# Patient Record
Sex: MALE | Race: WHITE | NOT HISPANIC OR LATINO | Employment: OTHER | URBAN - METROPOLITAN AREA
[De-identification: names, ages, dates, MRNs, and addresses within clinical notes are randomized per-mention and may not be internally consistent; named-entity substitution may affect disease eponyms.]

---

## 2024-08-29 ENCOUNTER — HOSPITAL ENCOUNTER (INPATIENT)
Facility: HOSPITAL | Age: 79
LOS: 12 days | Discharge: NURSING, REHAB OR LONG-TERM CARE | DRG: 064 | End: 2024-09-11
Attending: EMERGENCY MEDICINE
Payer: COMMERCIAL

## 2024-08-29 ENCOUNTER — APPOINTMENT (EMERGENCY)
Dept: RADIOLOGY | Facility: HOSPITAL | Age: 79
DRG: 064 | End: 2024-08-29
Payer: COMMERCIAL

## 2024-08-29 DIAGNOSIS — I63.9 ACUTE STROKE DUE TO ISCHEMIA (HCC): ICD-10-CM

## 2024-08-29 DIAGNOSIS — R41.82 ALTERED MENTAL STATUS: Primary | ICD-10-CM

## 2024-08-29 DIAGNOSIS — E53.8 B12 DEFICIENCY: ICD-10-CM

## 2024-08-29 PROBLEM — I63.40 CEREBROVASCULAR ACCIDENT (CVA) DUE TO EMBOLISM OF CEREBRAL ARTERY (HCC): Status: ACTIVE | Noted: 2024-08-29

## 2024-08-29 PROBLEM — F03.90 DEMENTIA (HCC): Status: ACTIVE | Noted: 2024-08-29

## 2024-08-29 LAB
ALBUMIN SERPL BCG-MCNC: 3.9 G/DL (ref 3.5–5)
ALP SERPL-CCNC: 54 U/L (ref 34–104)
ALT SERPL W P-5'-P-CCNC: 10 U/L (ref 7–52)
AMPHETAMINES SERPL QL SCN: NEGATIVE
ANION GAP SERPL CALCULATED.3IONS-SCNC: 8 MMOL/L (ref 4–13)
AST SERPL W P-5'-P-CCNC: 16 U/L (ref 13–39)
ATRIAL RATE: 61 BPM
BARBITURATES UR QL: NEGATIVE
BASOPHILS # BLD AUTO: 0.08 THOUSANDS/ÂΜL (ref 0–0.1)
BASOPHILS NFR BLD AUTO: 1 % (ref 0–1)
BENZODIAZ UR QL: NEGATIVE
BILIRUB SERPL-MCNC: 1.17 MG/DL (ref 0.2–1)
BILIRUB UR QL STRIP: NEGATIVE
BUN SERPL-MCNC: 17 MG/DL (ref 5–25)
CALCIUM SERPL-MCNC: 8.7 MG/DL (ref 8.4–10.2)
CHLORIDE SERPL-SCNC: 101 MMOL/L (ref 96–108)
CLARITY UR: CLEAR
CO2 SERPL-SCNC: 27 MMOL/L (ref 21–32)
COCAINE UR QL: NEGATIVE
COLOR UR: NORMAL
CREAT SERPL-MCNC: 1.13 MG/DL (ref 0.6–1.3)
EOSINOPHIL # BLD AUTO: 0.53 THOUSAND/ÂΜL (ref 0–0.61)
EOSINOPHIL NFR BLD AUTO: 5 % (ref 0–6)
ERYTHROCYTE [DISTWIDTH] IN BLOOD BY AUTOMATED COUNT: 14.7 % (ref 11.6–15.1)
ETHANOL SERPL-MCNC: <10 MG/DL
FENTANYL UR QL SCN: NEGATIVE
GFR SERPL CREATININE-BSD FRML MDRD: 62 ML/MIN/1.73SQ M
GLUCOSE SERPL-MCNC: 92 MG/DL (ref 65–140)
GLUCOSE UR STRIP-MCNC: NEGATIVE MG/DL
HCT VFR BLD AUTO: 44.5 % (ref 36.5–49.3)
HGB BLD-MCNC: 14.7 G/DL (ref 12–17)
HGB UR QL STRIP.AUTO: NEGATIVE
HYDROCODONE UR QL SCN: NEGATIVE
IMM GRANULOCYTES # BLD AUTO: 0.04 THOUSAND/UL (ref 0–0.2)
IMM GRANULOCYTES NFR BLD AUTO: 0 % (ref 0–2)
KETONES UR STRIP-MCNC: NEGATIVE MG/DL
LEUKOCYTE ESTERASE UR QL STRIP: NEGATIVE
LYMPHOCYTES # BLD AUTO: 2.73 THOUSANDS/ÂΜL (ref 0.6–4.47)
LYMPHOCYTES NFR BLD AUTO: 28 % (ref 14–44)
MCH RBC QN AUTO: 31 PG (ref 26.8–34.3)
MCHC RBC AUTO-ENTMCNC: 33 G/DL (ref 31.4–37.4)
MCV RBC AUTO: 94 FL (ref 82–98)
METHADONE UR QL: NEGATIVE
MONOCYTES # BLD AUTO: 1.09 THOUSAND/ÂΜL (ref 0.17–1.22)
MONOCYTES NFR BLD AUTO: 11 % (ref 4–12)
NEUTROPHILS # BLD AUTO: 5.26 THOUSANDS/ÂΜL (ref 1.85–7.62)
NEUTS SEG NFR BLD AUTO: 55 % (ref 43–75)
NITRITE UR QL STRIP: NEGATIVE
NRBC BLD AUTO-RTO: 0 /100 WBCS
OPIATES UR QL SCN: NEGATIVE
OXYCODONE+OXYMORPHONE UR QL SCN: NEGATIVE
P AXIS: 72 DEGREES
PCP UR QL: NEGATIVE
PH UR STRIP.AUTO: 5 [PH]
PLATELET # BLD AUTO: 353 THOUSANDS/UL (ref 149–390)
PMV BLD AUTO: 9.4 FL (ref 8.9–12.7)
POTASSIUM SERPL-SCNC: 4.1 MMOL/L (ref 3.5–5.3)
PR INTERVAL: 156 MS
PROT SERPL-MCNC: 6.9 G/DL (ref 6.4–8.4)
PROT UR STRIP-MCNC: NEGATIVE MG/DL
QRS AXIS: 74 DEGREES
QRSD INTERVAL: 74 MS
QT INTERVAL: 416 MS
QTC INTERVAL: 418 MS
RBC # BLD AUTO: 4.74 MILLION/UL (ref 3.88–5.62)
SODIUM SERPL-SCNC: 136 MMOL/L (ref 135–147)
SP GR UR STRIP.AUTO: 1.02 (ref 1–1.03)
T WAVE AXIS: 65 DEGREES
THC UR QL: NEGATIVE
TSH SERPL DL<=0.05 MIU/L-ACNC: 1.08 UIU/ML (ref 0.45–4.5)
UROBILINOGEN UR STRIP-ACNC: <2 MG/DL
VENTRICULAR RATE: 61 BPM
WBC # BLD AUTO: 9.73 THOUSAND/UL (ref 4.31–10.16)

## 2024-08-29 PROCEDURE — 36415 COLL VENOUS BLD VENIPUNCTURE: CPT

## 2024-08-29 PROCEDURE — 70496 CT ANGIOGRAPHY HEAD: CPT

## 2024-08-29 PROCEDURE — 84443 ASSAY THYROID STIM HORMONE: CPT

## 2024-08-29 PROCEDURE — 99223 1ST HOSP IP/OBS HIGH 75: CPT | Performed by: INTERNAL MEDICINE

## 2024-08-29 PROCEDURE — 81003 URINALYSIS AUTO W/O SCOPE: CPT

## 2024-08-29 PROCEDURE — 85025 COMPLETE CBC W/AUTO DIFF WBC: CPT

## 2024-08-29 PROCEDURE — 93005 ELECTROCARDIOGRAM TRACING: CPT

## 2024-08-29 PROCEDURE — 93010 ELECTROCARDIOGRAM REPORT: CPT | Performed by: INTERNAL MEDICINE

## 2024-08-29 PROCEDURE — 80307 DRUG TEST PRSMV CHEM ANLYZR: CPT | Performed by: EMERGENCY MEDICINE

## 2024-08-29 PROCEDURE — 99285 EMERGENCY DEPT VISIT HI MDM: CPT | Performed by: EMERGENCY MEDICINE

## 2024-08-29 PROCEDURE — 99284 EMERGENCY DEPT VISIT MOD MDM: CPT

## 2024-08-29 PROCEDURE — 71046 X-RAY EXAM CHEST 2 VIEWS: CPT

## 2024-08-29 PROCEDURE — 80053 COMPREHEN METABOLIC PANEL: CPT

## 2024-08-29 PROCEDURE — 82077 ASSAY SPEC XCP UR&BREATH IA: CPT | Performed by: EMERGENCY MEDICINE

## 2024-08-29 PROCEDURE — 70498 CT ANGIOGRAPHY NECK: CPT

## 2024-08-29 RX ORDER — OLANZAPINE 10 MG/2ML
2.5 INJECTION, POWDER, FOR SOLUTION INTRAMUSCULAR ONCE
Status: COMPLETED | OUTPATIENT
Start: 2024-08-29 | End: 2024-08-29

## 2024-08-29 RX ORDER — WATER 10 ML/10ML
INJECTION INTRAMUSCULAR; INTRAVENOUS; SUBCUTANEOUS
Status: COMPLETED
Start: 2024-08-29 | End: 2024-08-29

## 2024-08-29 RX ORDER — ASPIRIN 81 MG/1
81 TABLET, CHEWABLE ORAL DAILY
Status: DISCONTINUED | OUTPATIENT
Start: 2024-08-30 | End: 2024-09-11 | Stop reason: HOSPADM

## 2024-08-29 RX ORDER — ATORVASTATIN CALCIUM 40 MG/1
40 TABLET, FILM COATED ORAL EVERY EVENING
Status: DISCONTINUED | OUTPATIENT
Start: 2024-08-29 | End: 2024-09-11 | Stop reason: HOSPADM

## 2024-08-29 RX ADMIN — IOHEXOL 85 ML: 350 INJECTION, SOLUTION INTRAVENOUS at 20:58

## 2024-08-29 RX ADMIN — OLANZAPINE 2.5 MG: 10 INJECTION, POWDER, LYOPHILIZED, FOR SOLUTION INTRAMUSCULAR at 23:52

## 2024-08-29 RX ADMIN — WATER 10 ML: 1 INJECTION INTRAMUSCULAR; INTRAVENOUS; SUBCUTANEOUS at 23:52

## 2024-08-29 NOTE — ED PROVIDER NOTES
History  Chief Complaint   Patient presents with    Medical Problem     Pt arrived via EMS. 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 Pt Aox4 and has no complaints on arrival.     79-year-old male presents ED via EMS for evaluation of altered mental status beginning this morning.  Per family, patient has been more confused today.  He normally is alert and oriented x 4.  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  He lives at home with his wife.  Denies sick contacts.  Patient denies headache, dizziness, lightheadedness, trouble walking, focal weakness or numbness, chest pain, shortness of breath, abdominal pain, nausea, vomiting, urinary complaints.        None       History reviewed. No pertinent past medical history.    History reviewed. No pertinent surgical history.    History reviewed. No pertinent family history.  I have reviewed and agree with the history as documented.    E-Cigarette/Vaping     E-Cigarette/Vaping Substances           Review of Systems   Constitutional:  Negative for chills and fever.   HENT:  Negative for congestion.    Respiratory:  Negative for cough and shortness of breath.    Cardiovascular:  Negative for chest pain.   Gastrointestinal:  Negative for abdominal pain, nausea and vomiting.   Genitourinary:  Negative for difficulty urinating.   Musculoskeletal:  Negative for back pain and neck pain.   Skin:  Negative for color change.   Neurological:  Negative for dizziness, syncope, weakness, light-headedness, numbness and headaches.   Psychiatric/Behavioral:  Positive for confusion.        Physical Exam  ED Triage Vitals [08/29/24 1802]   Temperature Pulse Respirations Blood Pressure SpO2   97.6 °F (36.4 °C) 57 18 161/72 97 %      Temp Source Heart Rate Source Patient Position - Orthostatic VS BP Location FiO2 (%)   Oral Monitor Sitting Right arm --      Pain Score       --              Orthostatic Vital Signs  Vitals:    08/29/24 1802 08/29/24 1900   BP: 161/72 125/64   Pulse: 57 56   Patient Position - Orthostatic VS: Sitting Sitting       Physical Exam  Vitals and nursing note reviewed.   Constitutional:       General: He is not in acute distress.     Appearance: Normal appearance. He is normal weight. He is not ill-appearing, toxic-appearing or diaphoretic.   HENT:      Head: Normocephalic and atraumatic.      Nose: No rhinorrhea.      Mouth/Throat:      Mouth: Mucous membranes are moist.   Eyes:      Conjunctiva/sclera: Conjunctivae normal.   Cardiovascular:      Rate and Rhythm: Normal rate and regular rhythm.      Pulses: Normal pulses.      Heart sounds: Normal heart sounds.   Pulmonary:      Effort: Pulmonary effort is normal.      Breath sounds: Normal breath sounds.   Abdominal:      General: Abdomen is flat.      Palpations: Abdomen is soft.      Tenderness: There is no abdominal tenderness.   Musculoskeletal:         General: No tenderness.      Cervical back: Neck supple.      Right lower leg: No edema.      Left lower leg: No edema.   Skin:     General: Skin is warm and dry.      Capillary Refill: Capillary refill takes less than 2 seconds.   Neurological:      General: No focal deficit present.      Mental Status: He is alert.      Cranial Nerves: No cranial nerve deficit.      Sensory: No sensory deficit.      Motor: No weakness.      Coordination: Coordination normal.      Comments: Patient oriented to person and place, but not month, year, or situation   Psychiatric:         Mood and Affect: Mood normal.         Behavior: Behavior normal.         ED Medications  Medications   iohexol (OMNIPAQUE) 350 MG/ML injection (MULTI-DOSE) 85 mL (85 mL Intravenous Given 8/29/24 2058)       Diagnostic Studies  Results Reviewed       Procedure Component Value Units Date/Time    TSH [[id number]]  (Normal) Collected: 08/29/24 1944    Lab Status: Final result Specimen: Blood from Arm,  Right Updated: 08/29/24 2105     TSH 3RD GENERATON 1.076 uIU/mL     Comprehensive metabolic panel [[id number]]  (Abnormal) Collected: 08/29/24 1944    Lab Status: Final result Specimen: Blood from Arm, Right Updated: 08/29/24 2030     Sodium 136 mmol/L      Potassium 4.1 mmol/L      Chloride 101 mmol/L      CO2 27 mmol/L      ANION GAP 8 mmol/L      BUN 17 mg/dL      Creatinine 1.13 mg/dL      Glucose 92 mg/dL      Calcium 8.7 mg/dL      AST 16 U/L      ALT 10 U/L      Alkaline Phosphatase 54 U/L      Total Protein 6.9 g/dL      Albumin 3.9 g/dL      Total Bilirubin 1.17 mg/dL      eGFR 62 ml/min/1.73sq m     Narrative:      National Kidney Disease Foundation guidelines for Chronic Kidney Disease (CKD):     Stage 1 with normal or high GFR (GFR > 90 mL/min/1.73 square meters)    Stage 2 Mild CKD (GFR = 60-89 mL/min/1.73 square meters)    Stage 3A Moderate CKD (GFR = 45-59 mL/min/1.73 square meters)    Stage 3B Moderate CKD (GFR = 30-44 mL/min/1.73 square meters)    Stage 4 Severe CKD (GFR = 15-29 mL/min/1.73 square meters)    Stage 5 End Stage CKD (GFR <15 mL/min/1.73 square meters)  Note: GFR calculation is accurate only with a steady state creatinine    Rapid drug screen, urine [[id number]]  (Normal) Collected: 08/29/24 1843    Lab Status: Final result Specimen: Urine, Other Updated: 08/29/24 1916     Amph/Meth UR Negative     Barbiturate Ur Negative     Benzodiazepine Urine Negative     Cocaine Urine Negative     Methadone Urine Negative     Opiate Urine Negative     PCP Ur Negative     THC Urine Negative     Oxycodone Urine Negative     Fentanyl Urine Negative     HYDROCODONE URINE Negative    Narrative:      FOR MEDICAL PURPOSES ONLY.   IF CONFIRMATION NEEDED PLEASE CONTACT THE LAB WITHIN 5 DAYS.    Drug Screen Cutoff Levels:  AMPHETAMINE/METHAMPHETAMINES  1000 ng/mL  BARBITURATES     200 ng/mL  BENZODIAZEPINES     200 ng/mL  COCAINE      300 ng/mL  METHADONE      300 ng/mL  OPIATES      300  ng/mL  PHENCYCLIDINE     25 ng/mL  THC       50 ng/mL  OXYCODONE      100 ng/mL  FENTANYL      5 ng/mL  HYDROCODONE     300 ng/mL    Ethanol [[id number]]  (Normal) Collected: 08/29/24 1830    Lab Status: Final result Specimen: Blood from Arm, Left Updated: 08/29/24 1858     Ethanol Lvl <10 mg/dL     UA w Reflex to Microscopic w Reflex to Culture [[id number]] Collected: 08/29/24 1843    Lab Status: Final result Specimen: Urine, Other Updated: 08/29/24 1857     Color, UA Light Yellow     Clarity, UA Clear     Specific Gravity, UA 1.021     pH, UA 5.0     Leukocytes, UA Negative     Nitrite, UA Negative     Protein, UA Negative mg/dl      Glucose, UA Negative mg/dl      Ketones, UA Negative mg/dl      Urobilinogen, UA <2.0 mg/dl      Bilirubin, UA Negative     Occult Blood, UA Negative    CBC and differential [[id number]] Collected: 08/29/24 1830    Lab Status: Final result Specimen: Blood from Arm, Left Updated: 08/29/24 1844     WBC 9.73 Thousand/uL      RBC 4.74 Million/uL      Hemoglobin 14.7 g/dL      Hematocrit 44.5 %      MCV 94 fL      MCH 31.0 pg      MCHC 33.0 g/dL      RDW 14.7 %      MPV 9.4 fL      Platelets 353 Thousands/uL      nRBC 0 /100 WBCs      Segmented % 55 %      Immature Grans % 0 %      Lymphocytes % 28 %      Monocytes % 11 %      Eosinophils Relative 5 %      Basophils Relative 1 %      Absolute Neutrophils 5.26 Thousands/µL      Absolute Immature Grans 0.04 Thousand/uL      Absolute Lymphocytes 2.73 Thousands/µL      Absolute Monocytes 1.09 Thousand/µL      Eosinophils Absolute 0.53 Thousand/µL      Basophils Absolute 0.08 Thousands/µL                    CTA head and neck with and without contrast   Final Result by Royal Gardner DO (08/29 2123)      CT Brain:   No acute intracranial no hemorrhage or mass effect. Severe chronic microvascular ischemic change and superimposed chronic infarcts as detailed above. Age-indeterminate right temporal infarct, likely chronic. MRI brain should be  "considered for further    evaluation of this area and other potential small areas of infarction unseen on CT.      CT Angiography:   No evidence of large aneurysm, high-grade stenosis, or large vessel occlusion.      This study was marked for \"Immediate\" notification in EPIC.               Workstation performed: [id number]         XR chest 2 views    (Results Pending)         Procedures  Procedures      ED Course  ED Course as of 08/29/24 2142   Thu Aug 29, 2024   1918 Rapid drug screen, urine  negative   1918 WBC: 9.73   1918 UA w Reflex to Microscopic w Reflex to Culture  negative                                       Medical Decision Making  79-year-old male with past medical history watershed infarct presents ED for evaluation of altered mental status beginning this morning before 11 AM.  Patient himself has no complaints.  On exam, vital signs unremarkable.  Afebrile.  Patient is in no acute distress.  He is oriented to person and place, but  not month, year, situation.  Patient has 5 out of 5 strength in all extremities, normal coordination.  No focal neurologic deficit.  Differential diagnosis:  I doubt infectious etiology as patient has normal vital signs and is not ill-appearing. Rule out stroke.  Plan: CBC, CMP, TSH, UA.  Will order CTA head and neck.  Plan: Will likely admit this patient for inpatient MRI.  At the time of signout, CTA head and neck read pending.    Amount and/or Complexity of Data Reviewed  Labs: ordered. Decision-making details documented in ED Course.  Radiology: ordered.    Risk  Prescription drug management.          Disposition  Final diagnoses:   Altered mental status     Time reflects when diagnosis was documented in both MDM as applicable and the Disposition within this note       Time User Action Codes Description Comment    8/29/2024  8:52 PM Angel Ruano Add [R41.82] Altered mental status           ED Disposition       None          Follow-up Information    None   "       Patient's Medications    No medications on file     No discharge procedures on file.    PDMP Review       None             ED Provider  Attending physically available and evaluated Edy Putnam. I managed the patient along with the ED Attending.    Electronically Signed by           Angel Ruano DO  08/29/24 6823

## 2024-08-29 NOTE — ED ATTENDING ATTESTATION
I saw and evaluated the patient. I have discussed the patient with the resident physician and agree with the resident's findings, assessment and plan as documented in the resident physician's note, unless otherwise documented below. All available laboratory and imaging studies were reviewed by myself.  I was present for key portions of any procedure(s) performed by the resident and I was immediately available to provide assistance.     I agree with the current assessment done in the Emergency Department. I have conducted an independent evaluation of this patient    Final Diagnosis:  1. Altered mental status             Chief Complaint   Patient presents with    Medical Problem     Pt arrived via EMS. 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 Pt Aox4 and has no complaints on arrival.     This is a 79 y.o. male with a history of CVA presenting by EMS for evaluation for increased confusion. Patient appears confused and is a poor historian. He has no complaints at this time. Per family members, patient was noted to be altered starting at 1100 today. Says this is how he presented when he had watershed stroke many years ago. He has not had any other focal neuro deficits. No speech change, weakness, sensory change, vision change, abnormal coordination, abnormal gait. Denies fever, chills, cough, chest pain, SOB, n/v/d, abdominal pain, headache, any other complaints. Family says he does not use drugs/ETOH, no risk of carbon monoxide exposure.       PMH:   has no past medical history on file.    PSH:   has no past surgical history on file.    Social:  Social History     Substance and Sexual Activity   Alcohol Use None     Social History     Tobacco Use   Smoking Status Not on file   Smokeless Tobacco Not on file     Social History     Substance and Sexual Activity   Drug Use Not on file     PE:  Vitals:    09/04/24 0225 09/04/24 0705 09/04/24 1544  09/05/24 0715   BP: 100/54 123/72 120/75 123/73   Pulse: 65 66 61 71   Resp:   16 19   Temp:  98.2 °F (36.8 °C) 97.8 °F (36.6 °C) 98.5 °F (36.9 °C)   TempSrc:       SpO2: 96% 95% 96% 93%   Weight:       Height:               Physical exam:  GENERAL APPEARANCE: Appears comfortable, no acute distress, calm and cooperative   NEURO: Oriented to self, place, but not year or month, no focal deficits, cranial nerves grossly intact, clear fluent speech, no facial asymmetry, 5/5 strength in all four extremities. No pronator drift. Normal finger nose finger. Visual fields intact Normal gait.    HEENT: Normocephalic, atraumatic, moist mucous membranes   Neck: Supple, full ROM  CV: RRR, no murmurs, rubs, or gallops  LUNGS: CTAB, no wheezing, rales, or rhonchi  GI: Abdomen soft, non-tender, no rebound or guarding   MSK: Extremities non-tender, no pitting edema  SKIN: Warm and dry      Assessment and plan: This is a 79 y.o. male with a history of CVA presenting by EMS for evaluation for increased confusion. Within ddx consider metabolic abnormality, thyroid disease, drug/ETOH, medication effect, CVA, infection such as UTI. Labs and imaging to evaluate. Patient currently resting comfortably with no complaints.     At time of sign out, pending CT. Plan to admit if not returned to baseline. Oncoming physician aware of patient's status and agrees to follow up and reassess. Patient remains stable at this time.       Code Status: Level 3 - DNAR and DNI  Advance Directive and Living Will:      Power of :    POLST:      Medications   atorvastatin (LIPITOR) tablet 40 mg (40 mg Oral Given 9/4/24 1826)   aspirin chewable tablet 81 mg (81 mg Oral Given 9/5/24 0908)   OLANZapine (ZyPREXA) IM injection 5 mg (5 mg Intramuscular Given 8/30/24 2311)   cyanocobalamin injection 1,000 mcg (1,000 mcg Intramuscular Given 8/31/24 1158)   cyanocobalamin (VITAMIN B-12) tablet 1,000 mcg (1,000 mcg Oral Given 9/5/24 0908)   enoxaparin (LOVENOX)  "subcutaneous injection 40 mg (40 mg Subcutaneous Given 9/5/24 0908)   senna-docusate sodium (SENOKOT S) 8.6-50 mg per tablet 1 tablet (1 tablet Oral Not Given 9/5/24 0906)   iohexol (OMNIPAQUE) 350 MG/ML injection (MULTI-DOSE) 85 mL (85 mL Intravenous Given 8/29/24 2058)   OLANZapine (ZyPREXA) IM injection 2.5 mg (2.5 mg Intramuscular Given 8/29/24 2352)   sterile water injection **ADS Override Pull** (10 mL  Given 8/29/24 2352)   LORazepam (ATIVAN) injection 0.5 mg (0.5 mg Intravenous Given 8/30/24 1542)   sterile water injection **ADS Override Pull** (10 mL  Given 8/30/24 2311)     MRI brain wo contrast   Final Result      -Acute infarct involving the right temporal lobe with petechial hemorrhage.   -Small area of acute infarct within the left parietal periventricular white matter.   -Remote supra and infratentorial infarcts and chronic microangiopathic changes as above.         The study was marked in EPIC for immediate notification.      Workstation performed: [id number]         CTA head and neck with and without contrast   Final Result      CT Brain:   No acute intracranial no hemorrhage or mass effect. Severe chronic microvascular ischemic change and superimposed chronic infarcts as detailed above. Age-indeterminate right temporal infarct, likely chronic. MRI brain should be considered for further    evaluation of this area and other potential small areas of infarction unseen on CT.      CT Angiography:   No evidence of large aneurysm, high-grade stenosis, or large vessel occlusion.      This study was marked for \"Immediate\" notification in EPIC.               Workstation performed: [id number]         XR chest 2 views   Final Result      No acute cardiopulmonary disease.            Workstation performed: [id number]           Orders Placed This Encounter   Procedures    XR chest 2 views    CTA head and neck with and without contrast    MRI brain wo contrast    UA w Reflex to Microscopic w Reflex to Culture    CBC " and differential    Comprehensive metabolic panel    TSH    Ethanol    Rapid drug screen, urine    Lipid Panel with Direct LDL reflex    Hemoglobin A1c w/EAG Estimation    Comprehensive metabolic panel    Magnesium    CBC and differential    Protime-INR    APTT    Vitamin B12    Basic metabolic panel    CBC    Basic metabolic panel    CBC    Diet Cardiovascular; Sodium 2 GM    Nursing communication Continue IV as ordered.    Notify admitting physician    Notify admitting physician on arrival    Vital Signs q 1h x 4 hours    Vital Signs q 2 h x 8 hours    Vital Signs q 4h x 72 hours    Vital Signs q 8h if stable    Notify physician    Up and OOB as tolerated    I/O    Neuro checks    Provide Stroke Education to Patients    Nursing dysphagia screen prior to starting diet    Baseline NIH stroke scale on Admission    Reassess NIH stroke scale every 24 hours for 2 days    NIH stroke scale at Discharge    Insert peripheral IV    Maintain IV access    Apply SCD or Foot pumps    Nursing Communication Notify Provider/AP of positive CAM    Nursing dysphagia screen    Urinary retention protocol    Nursing Communication Monitor patient for constipation and notify provider if bowel regimen not ordered.    Nursing Communication Daytime Wakefulness Hygiene From 7:00 am until 7:00 pm, potential interventions could include the following, if applicable based on clinical condition: - Ambient light should reflect daytime (lights on, window shades and door...    Nursing Communication Nighttime Sleep Hygiene From 7:00 pm until 7:00 am, potential interventions could include the following, if applicable based on clinical condition: - Ambient light in room to reflect nighttime (lights off, window shades close...    Up and OOB as tolerated    Level 3 - DNAR (Do Not Attempt Resuscitation) and DNI (Do Not Intubate)    Inpatient consult to Neurology    Inpatient Consult to Nutrition Services    Inpatient consult to Case Management    CRISTINA suresh  and treat    PT eval and treat    SPEECH Language eval and treatment    ECG 12 lead    Echo complete w/ contrast if indicated    Place in Observation    INPATIENT ADMISSION    Fall precautions     Labs Reviewed   COMPREHENSIVE METABOLIC PANEL - Abnormal       Result Value Ref Range Status    Sodium 136  135 - 147 mmol/L Final    Potassium 4.1  3.5 - 5.3 mmol/L Final    Chloride 101  96 - 108 mmol/L Final    CO2 27  21 - 32 mmol/L Final    ANION GAP 8  4 - 13 mmol/L Final    BUN 17  5 - 25 mg/dL Final    Creatinine 1.13  0.60 - 1.30 mg/dL Final    Comment: Standardized to IDMS reference method    Glucose 92  65 - 140 mg/dL Final    Comment: If the patient is fasting, the ADA then defines impaired fasting glucose as > 100 mg/dL and diabetes as > or equal to 123 mg/dL.    Calcium 8.7  8.4 - 10.2 mg/dL Final    AST 16  13 - 39 U/L Final    ALT 10  7 - 52 U/L Final    Comment: Specimen collection should occur prior to Sulfasalazine administration due to the potential for falsely depressed results.     Alkaline Phosphatase 54  34 - 104 U/L Final    Total Protein 6.9  6.4 - 8.4 g/dL Final    Albumin 3.9  3.5 - 5.0 g/dL Final    Total Bilirubin 1.17 (*) 0.20 - 1.00 mg/dL Final    Comment: Use of this assay is not recommended for patients undergoing treatment with eltrombopag due to the potential for falsely elevated results.  N-acetyl-p-benzoquinone imine (metabolite of Acetaminophen) will generate erroneously low results in samples for patients that have taken an overdose of Acetaminophen.    eGFR 62  ml/min/1.73sq m Final    Narrative:     National Kidney Disease Foundation guidelines for Chronic Kidney Disease (CKD):     Stage 1 with normal or high GFR (GFR > 90 mL/min/1.73 square meters)    Stage 2 Mild CKD (GFR = 60-89 mL/min/1.73 square meters)    Stage 3A Moderate CKD (GFR = 45-59 mL/min/1.73 square meters)    Stage 3B Moderate CKD (GFR = 30-44 mL/min/1.73 square meters)    Stage 4 Severe CKD (GFR = 15-29 mL/min/1.73  square meters)    Stage 5 End Stage CKD (GFR <15 mL/min/1.73 square meters)  Note: GFR calculation is accurate only with a steady state creatinine   TSH, 3RD GENERATION - Normal    TSH 3RD GENERATON 1.076  0.450 - 4.500 uIU/mL Final    Comment: Adult TSH (3rd generation) reference range follows the recommended guidelines of the American Thyroid Association, January, 2020.   MEDICAL ALCOHOL - Normal    Ethanol Lvl <10  <10 mg/dL Final   RAPID DRUG SCREEN, URINE - Normal    Amph/Meth UR Negative  Negative Final    Barbiturate Ur Negative  Negative Final    Benzodiazepine Urine Negative  Negative Final    Cocaine Urine Negative  Negative Final    Methadone Urine Negative  Negative Final    Opiate Urine Negative  Negative Final    PCP Ur Negative  Negative Final    THC Urine Negative  Negative Final    Oxycodone Urine Negative  Negative Final    Fentanyl Urine Negative  Negative Final    HYDROCODONE URINE Negative  Negative Final    Narrative:     FOR MEDICAL PURPOSES ONLY.   IF CONFIRMATION NEEDED PLEASE CONTACT THE LAB WITHIN 5 DAYS.    Drug Screen Cutoff Levels:  AMPHETAMINE/METHAMPHETAMINES  1000 ng/mL  BARBITURATES     200 ng/mL  BENZODIAZEPINES     200 ng/mL  COCAINE      300 ng/mL  METHADONE      300 ng/mL  OPIATES      300 ng/mL  PHENCYCLIDINE     25 ng/mL  THC       50 ng/mL  OXYCODONE      100 ng/mL  FENTANYL      5 ng/mL  HYDROCODONE     300 ng/mL   UA W REFLEX TO MICROSCOPIC WITH REFLEX TO CULTURE    Color, UA Light Yellow   Final    Clarity, UA Clear   Final    Specific Gravity, UA 1.021  1.003 - 1.030 Final    pH, UA 5.0  4.5, 5.0, 5.5, 6.0, 6.5, 7.0, 7.5, 8.0 Final    Leukocytes, UA Negative  Negative Final    Nitrite, UA Negative  Negative Final    Protein, UA Negative  Negative mg/dl Final    Glucose, UA Negative  Negative mg/dl Final    Ketones, UA Negative  Negative mg/dl Final    Urobilinogen, UA <2.0  <2.0 mg/dl mg/dl Final    Bilirubin, UA Negative  Negative Final    Occult Blood, UA Negative   "Negative Final   CBC AND DIFFERENTIAL    WBC 9.73  4.31 - 10.16 Thousand/uL Final    RBC 4.74  3.88 - 5.62 Million/uL Final    Hemoglobin 14.7  12.0 - 17.0 g/dL Final    Hematocrit 44.5  36.5 - 49.3 % Final    MCV 94  82 - 98 fL Final    MCH 31.0  26.8 - 34.3 pg Final    MCHC 33.0  31.4 - 37.4 g/dL Final    RDW 14.7  11.6 - 15.1 % Final    MPV 9.4  8.9 - 12.7 fL Final    Platelets 353  149 - 390 Thousands/uL Final    nRBC 0  /100 WBCs Final    Segmented % 55  43 - 75 % Final    Immature Grans % 0  0 - 2 % Final    Lymphocytes % 28  14 - 44 % Final    Monocytes % 11  4 - 12 % Final    Eosinophils Relative 5  0 - 6 % Final    Basophils Relative 1  0 - 1 % Final    Absolute Neutrophils 5.26  1.85 - 7.62 Thousands/µL Final    Absolute Immature Grans 0.04  0.00 - 0.20 Thousand/uL Final    Absolute Lymphocytes 2.73  0.60 - 4.47 Thousands/µL Final    Absolute Monocytes 1.09  0.17 - 1.22 Thousand/µL Final    Eosinophils Absolute 0.53  0.00 - 0.61 Thousand/µL Final    Basophils Absolute 0.08  0.00 - 0.10 Thousands/µL Final         Time reflects when diagnosis was documented in both MDM as applicable and the Disposition within this note       Time User Action Codes Description Comment    8/29/2024  8:52 PM Angel Ruano Add [R41.82] Altered mental status           ED Disposition       ED Disposition   Admit    Condition   Stable    Date/Time   Thu Aug 29, 2024 11:09 PM    Comment   Case was discussed with BRIGIDO and the patient's admission status was agreed to be Admission Status: observation status to the service of Dr. Canseco.               Follow-up Information    None       There are no discharge medications for this patient.    No discharge procedures on file.  None         Portions of the record may have been created with voice recognition software. Occasional wrong word or \"sound a like\" substitutions may have occurred due to the inherent limitations of voice recognition software. Read the chart carefully and recognize, " using context, where substitutions have occurred.    Electronically signed by:  Brit Townsend

## 2024-08-30 ENCOUNTER — APPOINTMENT (OUTPATIENT)
Dept: RADIOLOGY | Facility: HOSPITAL | Age: 79
DRG: 064 | End: 2024-08-30
Payer: COMMERCIAL

## 2024-08-30 ENCOUNTER — APPOINTMENT (OUTPATIENT)
Dept: NON INVASIVE DIAGNOSTICS | Facility: HOSPITAL | Age: 79
DRG: 064 | End: 2024-08-30
Payer: COMMERCIAL

## 2024-08-30 PROBLEM — J13 PNEUMOCOCCAL PNEUMONIA (HCC): Status: ACTIVE | Noted: 2024-08-30

## 2024-08-30 PROBLEM — Z90.81 S/P SPLENECTOMY: Status: ACTIVE | Noted: 2024-08-30

## 2024-08-30 LAB
ALBUMIN SERPL BCG-MCNC: 3.8 G/DL (ref 3.5–5)
ALP SERPL-CCNC: 52 U/L (ref 34–104)
ALT SERPL W P-5'-P-CCNC: 9 U/L (ref 7–52)
ANION GAP SERPL CALCULATED.3IONS-SCNC: 5 MMOL/L (ref 4–13)
AORTIC ROOT: 2.6 CM
APICAL FOUR CHAMBER EJECTION FRACTION: 55 %
APTT PPP: 26 SECONDS (ref 23–34)
ASCENDING AORTA: 3.1 CM
AST SERPL W P-5'-P-CCNC: 17 U/L (ref 13–39)
BASOPHILS # BLD AUTO: 0.08 THOUSANDS/ÂΜL (ref 0–0.1)
BASOPHILS NFR BLD AUTO: 1 % (ref 0–1)
BILIRUB SERPL-MCNC: 1.7 MG/DL (ref 0.2–1)
BSA FOR ECHO PROCEDURE: 1.78 M2
BUN SERPL-MCNC: 14 MG/DL (ref 5–25)
CALCIUM SERPL-MCNC: 8.7 MG/DL (ref 8.4–10.2)
CHLORIDE SERPL-SCNC: 101 MMOL/L (ref 96–108)
CHOLEST SERPL-MCNC: 178 MG/DL
CO2 SERPL-SCNC: 30 MMOL/L (ref 21–32)
CREAT SERPL-MCNC: 1.08 MG/DL (ref 0.6–1.3)
E WAVE DECELERATION TIME: 203 MS
E/A RATIO: 1
EOSINOPHIL # BLD AUTO: 0.53 THOUSAND/ÂΜL (ref 0–0.61)
EOSINOPHIL NFR BLD AUTO: 5 % (ref 0–6)
ERYTHROCYTE [DISTWIDTH] IN BLOOD BY AUTOMATED COUNT: 14.7 % (ref 11.6–15.1)
EST. AVERAGE GLUCOSE BLD GHB EST-MCNC: 123 MG/DL
FRACTIONAL SHORTENING: 27 (ref 28–44)
GFR SERPL CREATININE-BSD FRML MDRD: 65 ML/MIN/1.73SQ M
GLUCOSE SERPL-MCNC: 92 MG/DL (ref 65–140)
HBA1C MFR BLD: 5.9 %
HCT VFR BLD AUTO: 43.4 % (ref 36.5–49.3)
HDLC SERPL-MCNC: 46 MG/DL
HGB BLD-MCNC: 14.5 G/DL (ref 12–17)
IMM GRANULOCYTES # BLD AUTO: 0.03 THOUSAND/UL (ref 0–0.2)
IMM GRANULOCYTES NFR BLD AUTO: 0 % (ref 0–2)
INR PPP: 1.02 (ref 0.85–1.19)
INTERVENTRICULAR SEPTUM IN DIASTOLE (PARASTERNAL SHORT AXIS VIEW): 1.1 CM
INTERVENTRICULAR SEPTUM: 1.1 CM (ref 0.6–1.1)
LAAS-AP2: 23 CM2
LAAS-AP4: 21.4 CM2
LDLC SERPL CALC-MCNC: 118 MG/DL (ref 0–100)
LEFT ATRIUM SIZE: 4.3 CM
LEFT ATRIUM VOLUME (MOD BIPLANE): 70 ML
LEFT ATRIUM VOLUME INDEX (MOD BIPLANE): 39.3 ML/M2
LEFT INTERNAL DIMENSION IN SYSTOLE: 3.3 CM (ref 2.1–4)
LEFT VENTRICULAR INTERNAL DIMENSION IN DIASTOLE: 4.5 CM (ref 3.5–6)
LEFT VENTRICULAR POSTERIOR WALL IN END DIASTOLE: 1.1 CM
LEFT VENTRICULAR STROKE VOLUME: 48 ML
LVSV (TEICH): 48 ML
LYMPHOCYTES # BLD AUTO: 2.97 THOUSANDS/ÂΜL (ref 0.6–4.47)
LYMPHOCYTES NFR BLD AUTO: 30 % (ref 14–44)
MAGNESIUM SERPL-MCNC: 2.2 MG/DL (ref 1.9–2.7)
MCH RBC QN AUTO: 31.3 PG (ref 26.8–34.3)
MCHC RBC AUTO-ENTMCNC: 33.4 G/DL (ref 31.4–37.4)
MCV RBC AUTO: 94 FL (ref 82–98)
MONOCYTES # BLD AUTO: 1.13 THOUSAND/ÂΜL (ref 0.17–1.22)
MONOCYTES NFR BLD AUTO: 12 % (ref 4–12)
MV E'TISSUE VEL-SEP: 10 CM/S
MV PEAK A VEL: 0.77 M/S
MV PEAK E VEL: 77 CM/S
MV STENOSIS PRESSURE HALF TIME: 59 MS
MV VALVE AREA P 1/2 METHOD: 3.73
NEUTROPHILS # BLD AUTO: 5.03 THOUSANDS/ÂΜL (ref 1.85–7.62)
NEUTS SEG NFR BLD AUTO: 52 % (ref 43–75)
NRBC BLD AUTO-RTO: 0 /100 WBCS
PLATELET # BLD AUTO: 353 THOUSANDS/UL (ref 149–390)
PMV BLD AUTO: 9.3 FL (ref 8.9–12.7)
POTASSIUM SERPL-SCNC: 3.8 MMOL/L (ref 3.5–5.3)
PROT SERPL-MCNC: 6.7 G/DL (ref 6.4–8.4)
PROTHROMBIN TIME: 13.7 SECONDS (ref 12.3–15)
RA PRESSURE ESTIMATED: 8 MMHG
RBC # BLD AUTO: 4.63 MILLION/UL (ref 3.88–5.62)
RIGHT ATRIAL 2D VOLUME: 50 ML
RIGHT ATRIUM AREA SYSTOLE A4C: 18.5 CM2
RIGHT VENTRICLE ID DIMENSION: 3.2 CM
RV PSP: 34 MMHG
SL CV LEFT ATRIUM LENGTH A2C: 5.8 CM
SL CV LV EF: 60
SL CV PED ECHO LEFT VENTRICLE DIASTOLIC VOLUME (MOD BIPLANE) 2D: 91 ML
SL CV PED ECHO LEFT VENTRICLE SYSTOLIC VOLUME (MOD BIPLANE) 2D: 43 ML
SODIUM SERPL-SCNC: 136 MMOL/L (ref 135–147)
TR MAX PG: 26 MMHG
TR PEAK VELOCITY: 2.6 M/S
TRICUSPID ANNULAR PLANE SYSTOLIC EXCURSION: 1.8 CM
TRICUSPID VALVE PEAK REGURGITATION VELOCITY: 2.56 M/S
TRIGL SERPL-MCNC: 70 MG/DL
VIT B12 SERPL-MCNC: 241 PG/ML (ref 180–914)
WBC # BLD AUTO: 9.77 THOUSAND/UL (ref 4.31–10.16)

## 2024-08-30 PROCEDURE — 83735 ASSAY OF MAGNESIUM: CPT | Performed by: INTERNAL MEDICINE

## 2024-08-30 PROCEDURE — 99222 1ST HOSP IP/OBS MODERATE 55: CPT | Performed by: STUDENT IN AN ORGANIZED HEALTH CARE EDUCATION/TRAINING PROGRAM

## 2024-08-30 PROCEDURE — 93306 TTE W/DOPPLER COMPLETE: CPT

## 2024-08-30 PROCEDURE — 93306 TTE W/DOPPLER COMPLETE: CPT | Performed by: INTERNAL MEDICINE

## 2024-08-30 PROCEDURE — 82607 VITAMIN B-12: CPT | Performed by: INTERNAL MEDICINE

## 2024-08-30 PROCEDURE — 83036 HEMOGLOBIN GLYCOSYLATED A1C: CPT | Performed by: INTERNAL MEDICINE

## 2024-08-30 PROCEDURE — 99233 SBSQ HOSP IP/OBS HIGH 50: CPT

## 2024-08-30 PROCEDURE — 92610 EVALUATE SWALLOWING FUNCTION: CPT

## 2024-08-30 PROCEDURE — 97167 OT EVAL HIGH COMPLEX 60 MIN: CPT

## 2024-08-30 PROCEDURE — 70551 MRI BRAIN STEM W/O DYE: CPT

## 2024-08-30 PROCEDURE — 97163 PT EVAL HIGH COMPLEX 45 MIN: CPT

## 2024-08-30 PROCEDURE — 85025 COMPLETE CBC W/AUTO DIFF WBC: CPT | Performed by: INTERNAL MEDICINE

## 2024-08-30 PROCEDURE — 80053 COMPREHEN METABOLIC PANEL: CPT | Performed by: INTERNAL MEDICINE

## 2024-08-30 PROCEDURE — 85610 PROTHROMBIN TIME: CPT | Performed by: INTERNAL MEDICINE

## 2024-08-30 PROCEDURE — 80061 LIPID PANEL: CPT | Performed by: INTERNAL MEDICINE

## 2024-08-30 PROCEDURE — 85730 THROMBOPLASTIN TIME PARTIAL: CPT | Performed by: INTERNAL MEDICINE

## 2024-08-30 RX ORDER — LORAZEPAM 2 MG/ML
0.5 INJECTION INTRAMUSCULAR ONCE AS NEEDED
Status: COMPLETED | OUTPATIENT
Start: 2024-08-30 | End: 2024-08-30

## 2024-08-30 RX ORDER — WATER 10 ML/10ML
INJECTION INTRAMUSCULAR; INTRAVENOUS; SUBCUTANEOUS
Status: COMPLETED
Start: 2024-08-30 | End: 2024-08-30

## 2024-08-30 RX ORDER — OLANZAPINE 10 MG/2ML
5 INJECTION, POWDER, FOR SOLUTION INTRAMUSCULAR EVERY 6 HOURS PRN
Status: DISCONTINUED | OUTPATIENT
Start: 2024-08-30 | End: 2024-09-11 | Stop reason: HOSPADM

## 2024-08-30 RX ORDER — DEXTROSE MONOHYDRATE AND SODIUM CHLORIDE 5; .9 G/100ML; G/100ML
75 INJECTION, SOLUTION INTRAVENOUS CONTINUOUS
Status: DISCONTINUED | OUTPATIENT
Start: 2024-08-30 | End: 2024-08-30

## 2024-08-30 RX ORDER — SODIUM CHLORIDE 9 MG/ML
100 INJECTION, SOLUTION INTRAVENOUS CONTINUOUS
Status: DISCONTINUED | OUTPATIENT
Start: 2024-08-30 | End: 2024-09-01

## 2024-08-30 RX ADMIN — SODIUM CHLORIDE 100 ML/HR: 0.9 INJECTION, SOLUTION INTRAVENOUS at 17:01

## 2024-08-30 RX ADMIN — WATER 10 ML: 1 INJECTION INTRAMUSCULAR; INTRAVENOUS; SUBCUTANEOUS at 23:11

## 2024-08-30 RX ADMIN — OLANZAPINE 5 MG: 10 INJECTION, POWDER, FOR SOLUTION INTRAMUSCULAR at 23:11

## 2024-08-30 RX ADMIN — ATORVASTATIN CALCIUM 40 MG: 40 TABLET, FILM COATED ORAL at 17:03

## 2024-08-30 RX ADMIN — DEXTROSE AND SODIUM CHLORIDE 75 ML/HR: 5; .9 INJECTION, SOLUTION INTRAVENOUS at 02:23

## 2024-08-30 RX ADMIN — LORAZEPAM 0.5 MG: 2 INJECTION INTRAMUSCULAR; INTRAVENOUS at 15:42

## 2024-08-30 RX ADMIN — ASPIRIN 81 MG CHEWABLE TABLET 81 MG: 81 TABLET CHEWABLE at 09:33

## 2024-08-30 NOTE — PHYSICAL THERAPY NOTE
Physical Therapy Evaluation    Patient Name: Edy Putnam    Today's Date: 8/30/2024     Problem List  Principal Problem:    Altered mental status, unspecified  Active Problems:    Cerebrovascular accident (CVA) due to embolism of cerebral artery (HCC)    Dementia (HCC)    Pneumococcal pneumonia (HCC)    S/P splenectomy       Past Medical History  History reviewed. No pertinent past medical history.     Past Surgical History  History reviewed. No pertinent surgical history.     [facility]   08/30/24 1001   PT Last Visit   PT Visit Date 08/30/24   Note Type   Note type Evaluation   Pain Assessment   Pain Assessment Tool 0-10   Pain Score No Pain   Restrictions/Precautions   Weight Bearing Precautions Per Order No   Other Precautions Cognitive;Chair Alarm;Bed Alarm;Multiple lines;Telemetry;Fall Risk   Home Living   Type of Home House   Home Layout Laundry in basement;Performs ADLs on one level;Able to live on main level with bedroom/bathroom;Access;Stairs to enter with rails;Two level   Bathroom Shower/Tub Tub/shower unit   Bathroom Toilet Standard   Bathroom Equipment Grab bars in shower   Bathroom Accessibility Accessible   Home Equipment Cane;Walker  (denies use PTA)   Additional Comments pt poor historian, per CM notes pt lives with wife in 2SH with 2 DAVID   Prior Function   Level of [place] Independent with ADLs;Independent with functional mobility;Independent with IADLS   Lives With Spouse   Receives Help From Family   IADLs Independent with driving;Independent with meal prep;Independent with medication management   Falls in the last 6 months 0   Vocational Retired   Comments pt reports independence PTA   General   Family/Caregiver Present No   Cognition   Overall Cognitive Status (S)  Impaired   Arousal/Participation Cooperative   Orientation Level Oriented to person;Oriented to place;Disoriented to time;Disoriented to situation   Memory Decreased short term  "memory;Decreased recall of recent events;Decreased recall of precautions   Following Commands Follows one step commands with increased time or repetition   Comments pt pleasant and cooperative   Subjective   Subjective \"Can I go to the bathroom\"   RLE Assessment   RLE Assessment WFL   LLE Assessment   LLE Assessment WFL   Light Touch   RLE Light Touch Grossly intact   LLE Light Touch Grossly intact   Bed Mobility   Supine to Sit Unable to assess   Sit to Supine Unable to assess   Additional Comments pt OOB in chair upon arrival   Transfers   Sit to Stand 4  Minimal assistance   Additional items Assist x 1;Armrests;Increased time required   Stand to Sit 4  Minimal assistance   Additional items Assist x 1;Armrests;Increased time required   Additional Comments +SBA of 2nd   Ambulation/Elevation   Gait pattern Improper Weight shift;Narrow HANK;Decreased foot clearance;Short stride;Excessively slow   Gait Assistance 4  Minimal assist   Additional items Assist x 2   Assistive Device Other (Comment)  (b/l HHA)   Distance 10'x2   Stair Management Assistance Not tested   Balance   Static Sitting Fair   Dynamic Sitting Fair -   Static Standing Poor +   Dynamic Standing Poor   Ambulatory Poor -   Endurance Deficit   Endurance Deficit Yes   Endurance Deficit Description pt limited by weakness, fatigue and decreased activity tolerance   Activity Tolerance   Activity Tolerance Patient limited by fatigue   Medical Staff Made Aware PT CRISTINA Ross Charlene   Nurse Made Aware yes-cleared   Assessment   Prognosis Good   Problem List Decreased strength;Decreased endurance;Impaired balance;Decreased mobility;Decreased coordination;Decreased cognition;Impaired judgement;Decreased safety awareness   Assessment Pt is an 79 y.o. male presenting to [facility] on 8/29/24 for initial dx of altered mental status. Pt  has no past medical history on file. Active problems listed above. Pt presents as a high complexity evaluation due to Ongoing medical " management for primary dx, Increased reliance on more restrictive AD compared to baseline, Decreased activity tolerance compared to baseline, Fall risk, Increased assistance needed from caregiver at current time, Ongoing telemetry monitoring, Cog status, Continuous pulse oximetry monitoring . Pt currently requires min Ax1 +SBA of 2nd for transfers, min Ax2 for ambulating 10'x2 with b/l HHA. PT is limited by cognition and deficits in strength, balance, mobility, endurance and activity tolerance limiting his ability to actively participate in his home or community. Pt presents below his functional baseline and will benefit from continued acute PT services to address deficits. Recommend level 2 resources pending progress in acute care. The patient's AM-PAC Basic Mobility Inpatient Short Form Raw Score is 14. A Raw score of less than 16 suggests the patient may benefit from discharge to post-acute rehabilitation services. Please also refer to the recommendation of the Physical Therapist for safe discharge planning. Pt left upright in chair with chair alarm donned, call bell and personal items within reach and all needs met.   Barriers to Discharge Inaccessible home environment;Decreased caregiver support   Goals   Patient Goals none stated   [facility] Expiration Date 09/13/24   Short Term Goal #1 In 14 days pt will complete bed mobility at mod I to increase independence and decrease caregiver burden. Pt will complete transfers at mod I to increase independence and decrease caregiver burden. Pt will ambulate 300' at mod I to promote safe access to home and community. Pt will negotiate 2 steps at supervision to promote safe access to home and community.   PT Treatment Day 0   Plan   Treatment/Interventions Functional transfer training;LE strengthening/ROM;Elevations;Therapeutic exercise;Endurance training;Cognitive reorientation;Patient/family training;Equipment eval/education;Bed mobility;Gait training;Spoke to nursing;OT   PT  Frequency 2-3x/wk   Discharge Recommendation   Rehab Resource Intensity Level, PT II (Moderate Resource Intensity)   Equipment Recommended Other (Comment)  (TBD)   Additional Comments (S)  level 2 vs level 3 pending progress   AM-PAC Basic Mobility Inpatient   Turning in Flat Bed Without Bedrails 3   Lying on Back to Sitting on Edge of Flat Bed Without Bedrails 3   Moving Bed to Chair 2   Standing Up From Chair Using Arms 3   Walk in Room 2   Climb 3-5 Stairs With Railing 1   Basic Mobility Inpatient Raw Score 14   Basic Mobility Standardized Score 35.55   [facility] Highest Level Of Mobility   [facility] Goal 4: Move to chair/commode   [facility] Achieved 6: Walk 10 steps or more   Modified [place] Scale   Modified Kristine Scale 4   Barthel Index   Feeding 10   Bathing 0   Grooming Score 5   Dressing Score 5   Bladder Score 10   Bowels Score 10   Toilet Use Score 5   Transfers (Bed/Chair) Score 5   Mobility (Level Surface) Score 0   Stairs Score 0   Barthel Index Score 50   Tanisha Pacheco, SPT

## 2024-08-30 NOTE — PLAN OF CARE
Problem: OCCUPATIONAL THERAPY ADULT  Goal: Performs self-care activities at highest level of function for planned discharge setting.  See evaluation for individualized goals.  Description: Treatment Interventions: ADL retraining, Visual perceptual retraining, Functional transfer training, UE strengthening/ROM, Endurance training, Cognitive reorientation, Patient/family training, Compensatory technique education, Energy conservation, Activityengagement  Equipment Recommended:  (pending progressing/social support)       See flowsheet documentation for full assessment, interventions and recommendations.   Note: Limitation: Decreased ADL status, Decreased Safe judgement during ADL, Decreased cognition, Decreased endurance, Decreased self-care trans, Decreased high-level ADLs  Prognosis: Fair  Assessment: Pt is a 79 y.o. male who was admitted to [facility] on 8/29/2024 with Altered mental status, unspecified s/p splenectomy, PNA, dementia. Patient  has no past medical history on file.   At baseline pt was completing I with ADL's/assistance with IADL's, no AD with functional mobility +drives. Pt lives spouse in a ranch style home with a few steps to enter. Currently pt requires min a  for overall ADLS and min/mod a without aD for functional mobility/transfers. Pt currently presents with impairments in the following categories -steps to enter environment, difficulty performing ADLS, difficulty performing IADLS , limited insight into deficits, compliance, flat affect, and decreased initiation and engagement  activity tolerance, endurance, standing balance/tolerance, sitting balance/tolerance, memory, insight, safety , judgement , attention , sequencing , communication, and interpersonal skills. These impairments, as well as pt's fatigue and risk for falls  limit pt's ability to safely engage in all baseline areas of occupation, includingdressing, toileting, functional mobility/transfers, community mobility,  laundry , driving, house maintenance, medication management, meal prep, cleaning, social participation , and leisure activities  From OT standpoint, recommend mod level II vs home with min level III pending progress/social support upon D/C.The patient's raw score on the AM-PAC Daily Activity Inpatient Short Form is 18. A raw score of less than 19 suggests the patient may benefit from discharge to post-acute rehabilitation services. Please refer to the recommendation of the Occupational Therapist for safe discharge planning. OT will continue to follow to address the below stated goals.     Rehab Resource Intensity Level, OT: II (Moderate Resource Intensity)

## 2024-08-30 NOTE — ASSESSMENT & PLAN NOTE
Follows with [facility] and Neurologist Dr. Peters  Spouse reports that over the past several months patient has been more forgetful with regards to medications and not able

## 2024-08-30 NOTE — PLAN OF CARE
Problem: PAIN - ADULT  Goal: Verbalizes/displays adequate comfort level or baseline comfort level  Description: Interventions:  - Encourage patient to monitor pain and request assistance  - Assess pain using appropriate pain scale  - Administer analgesics based on type and severity of pain and evaluate response  - Implement non-pharmacological measures as appropriate and evaluate response  - Consider cultural and social influences on pain and pain management  - Notify physician/advanced practitioner if interventions unsuccessful or patient reports new pain  Outcome: Progressing     Problem: INFECTION - ADULT  Goal: Absence or prevention of progression during hospitalization  Description: INTERVENTIONS:  - Assess and monitor for signs and symptoms of infection  - Monitor lab/diagnostic results  - Monitor all insertion sites, i.e. indwelling lines, tubes, and drains  - Monitor endotracheal if appropriate and nasal secretions for changes in amount and color  - [place] appropriate cooling/warming therapies per order  - Administer medications as ordered  - Instruct and encourage patient and family to use good hand hygiene technique  - Identify and instruct in appropriate isolation precautions for identified infection/condition  Outcome: Progressing     Problem: SAFETY ADULT  Goal: Patient will remain free of falls  Description: INTERVENTIONS:  - Educate patient/family on patient safety including physical limitations  - Instruct patient to call for assistance with activity   - Consult OT/PT to assist with strengthening/mobility   - Keep Call bell within reach  - Keep bed low and locked with side rails adjusted as appropriate  - Keep care items and personal belongings within reach  - Initiate and maintain comfort rounds  - Make Fall Risk Sign visible to staff  - Apply yellow socks and bracelet for high fall risk patients  - Consider moving patient to room near nurses station  Outcome: Progressing     Problem: DISCHARGE  PLANNING  Goal: Discharge to home or other facility with appropriate resources  Description: INTERVENTIONS:  - Identify barriers to discharge w/patient and caregiver  - Arrange for needed discharge resources and transportation as appropriate  - Identify discharge learning needs (meds, wound care, etc.)  - Arrange for interpretive services to assist at discharge as needed  - Refer to Case Management Department for coordinating discharge planning if the patient needs post-hospital services based on physician/advanced practitioner order or complex needs related to functional status, cognitive ability, or social support system  Outcome: Progressing     Problem: Knowledge Deficit  Goal: Patient/family/caregiver demonstrates understanding of disease process, treatment plan, medications, and discharge instructions  Description: Complete learning assessment and assess knowledge base.  Interventions:  - Provide teaching at level of understanding  - Provide teaching via preferred learning methods  Outcome: Progressing     Problem: NEUROSENSORY - ADULT  Goal: Achieves stable or improved neurological status  Description: INTERVENTIONS  - Monitor and report changes in neurological status  - Monitor vital signs such as temperature, blood pressure, glucose, and any other labs ordered   - Initiate measures to prevent increased intracranial pressure  - Monitor for seizure activity and implement precautions if appropriate      Outcome: Progressing     Problem: METABOLIC, FLUID AND ELECTROLYTES - ADULT  Goal: Electrolytes maintained within normal limits  Description: INTERVENTIONS:  - Monitor labs and assess patient for signs and symptoms of electrolyte imbalances  - Administer electrolyte replacement as ordered  - Monitor response to electrolyte replacements, including repeat lab results as appropriate  - Instruct patient on fluid and nutrition as appropriate  Outcome: Progressing        Problem: MUSCULOSKELETAL - ADULT  Goal:  Maintain or return mobility to safest level of function  Description: INTERVENTIONS:  - Assess patient's ability to carry out ADLs; assess patient's baseline for ADL function and identify physical deficits which impact ability to perform ADLs (bathing, care of mouth/teeth, toileting, grooming, dressing, etc.)  - Assess/evaluate cause of self-care deficits   - Assess range of motion  - Assess patient's mobility  - Assess patient's need for assistive devices and provide as appropriate  - Encourage maximum independence but intervene and supervise when necessary  - Involve family in performance of ADLs  - Assess for home care needs following discharge   - Consider OT consult to assist with ADL evaluation and planning for discharge  - Provide patient education as appropriate  Outcome: Progressing

## 2024-08-30 NOTE — H&P
"[facility]  H&P  Name: Edy Putnam 79 y.o. male I MRN: [id number]  Unit/Bed#: ED 17 I Date of Admission: 8/29/2024   Date of Service: 8/30/2024 I Hospital Day: 0      Assessment & Plan   * Altered mental status, unspecified  Assessment & Plan  Pt presents from home for evaluation of AMS starting at 1100 today.  No focal neurological deficits.   CTA head/ neck with no evidence of intercranial hemorrhage, presence of chronic microvascular ischemic changes and age-indeterminate right temporal infarct  EKG sinus without acute ischemic changes  CXR on my review without large infiltrate or effusion awaiting official read  MRI pending  Follow stroke pathway  Monitor on telemetry  Placed on ASA and high intensity statin  Neurology consulted; formal eval to be completed in AM  Fall and delirium precautions  PT/OT, case management for dispo planning    Pneumococcal pneumonia (HCC)  Assessment & Plan  H/o Pneumococcal Pneumonia in the late 1980's   Patient had a splenectomy in the past    Dementia (HCC)  Assessment & Plan  Follows with [facility] and Neurologist Dr. Peters  Spouse reports that over the past several months patient has been more forgetful with regards to medications and not able     Cerebrovascular accident (CVA) due to embolism of cerebral artery (HCC)  Assessment & Plan  Wife reports a history of a \"[place]\" CVA in 2015 and patient has had short term memory deficits since then   Patient is reportedly on aspirin as well as \"simvastatin\" but wife unsure of medication doses  Aspiring, High Intensity statin   Obtain records form [facility] where patient follows with Dr. Peters (neurology)           VTE Pharmacologic Prophylaxis: VTE Score: 9 High Risk (Score >/= 5) - Pharmacological DVT Prophylaxis Ordered: heparin. Sequential Compression Devices Ordered.  Code Status: Level 3 - DNAR and DNI   Discussion with family: Updated  (wife) via " phone.    Anticipated Length of Stay: Patient will be admitted on an observation basis with an anticipated length of stay of less than 2 midnights secondary to stroke workup.    Total Time Spent on Date of Encounter in care of patient: 35 mins. This time was spent on one or more of the following: performing physical exam; counseling and coordination of care; obtaining or reviewing history; documenting in the medical record; reviewing/ordering tests, medications or procedures; communicating with other healthcare professionals and discussing with patient's family/caregivers.    Chief Complaint: altered mental status    History of Present Illness:  Edy Putnam is a 79 y.o. male with a PMH of CVA who presents from home with increasing confusion beginning yesterday.  Of note, pt no neurologic deficits other than short term memory loss since CVA per wife Work up in ED included CTA head/ neck which showed evidence of chronic infarcts with no acute abnormality.  Will require MRI.  Lab work benign.  VSS.  On exam pt awake, alert and confused to place, time and situation.  Becoming increasingly agitated during encounter and unwilling to fully participate in exam. Admit to medicine service under observation to complete stroke work up.     Review of Systems:  Review of Systems   Unable to perform ROS: Mental status change (Altered Mental Status)       Past Medical and Surgical History:   History reviewed. No pertinent past medical history.    History reviewed. No pertinent surgical history.    Meds/Allergies:  Prior to Admission medications    Not on File     I have been unable to obtain / verify an up to date medication list despite all reasonable attempts.    Allergies: No Known Allergies    Social History:  Marital Status: /Civil Union   Occupation: n/a  Patient Pre-hospital Living Situation: Home  Patient Pre-hospital Level of Mobility: walks  Patient Pre-hospital Diet Restrictions: None  Substance Use History:    Social History     Substance and Sexual Activity   Alcohol Use None     Social History     Tobacco Use   Smoking Status Not on file   Smokeless Tobacco Not on file     Social History     Substance and Sexual Activity   Drug Use Not on file       Family History:  History reviewed. No pertinent family history.    Physical Exam:     Vitals:   Blood Pressure: 129/71 (08/29/24 2200)  Pulse: 60 (08/29/24 2200)  Temperature: 97.6 °F (36.4 °C) (08/29/24 1802)  Temp Source: Oral (08/29/24 1802)  Respirations: 13 (08/29/24 2200)  SpO2: 97 % (08/29/24 2200)    Physical Exam  Vitals reviewed.   Constitutional:       General: He is not in acute distress.  HENT:      Right Ear: External ear normal.      Left Ear: External ear normal.   Eyes:      Extraocular Movements: Extraocular movements intact.      Pupils: Pupils are equal, round, and reactive to light.   Cardiovascular:      Rate and Rhythm: Normal rate and regular rhythm.      Pulses: Normal pulses.      Heart sounds: Normal heart sounds. No murmur heard.  Pulmonary:      Effort: Pulmonary effort is normal.      Breath sounds: Normal breath sounds.   Abdominal:      General: Abdomen is flat. Bowel sounds are normal.      Palpations: Abdomen is soft.   Musculoskeletal:         General: Normal range of motion.   Skin:     General: Skin is warm and dry.      Capillary Refill: Capillary refill takes less than 2 seconds.   Neurological:      General: No focal deficit present.      Mental Status: He is alert. He is disoriented.          Additional Data:     Lab Results:  Results from last 7 days   Lab Units 08/29/24  1830   WBC Thousand/uL 9.73   HEMOGLOBIN g/dL 14.7   HEMATOCRIT % 44.5   PLATELETS Thousands/uL 353   SEGS PCT % 55   LYMPHO PCT % 28   MONO PCT % 11   EOS PCT % 5     Results from last 7 days   Lab Units 08/29/24  1944   SODIUM mmol/L 136   POTASSIUM mmol/L 4.1   CHLORIDE mmol/L 101   CO2 mmol/L 27   BUN mg/dL 17   CREATININE mg/dL 1.13   ANION GAP mmol/L 8  "  CALCIUM mg/dL 8.7   ALBUMIN g/dL 3.9   TOTAL BILIRUBIN mg/dL 1.17*   ALK PHOS U/L 54   ALT U/L 10   AST U/L 16   GLUCOSE RANDOM mg/dL 92             No results found for: \"HGBA1C\"        Lines/Drains:  Invasive Devices       Peripheral Intravenous Line  Duration             Peripheral IV 08/29/24 Right Antecubital <1 day                        Imaging: Reviewed radiology reports from this admission including: CTA h/n  CTA head and neck with and without contrast   Final Result by Royal Gardner DO (08/29 2123)      CT Brain:   No acute intracranial no hemorrhage or mass effect. Severe chronic microvascular ischemic change and superimposed chronic infarcts as detailed above. Age-indeterminate right temporal infarct, likely chronic. MRI brain should be considered for further    evaluation of this area and other potential small areas of infarction unseen on CT.      CT Angiography:   No evidence of large aneurysm, high-grade stenosis, or large vessel occlusion.      This study was marked for \"Immediate\" notification in EPIC.               Workstation performed: [id number]         XR chest 2 views    (Results Pending)   MRI brain wo contrast    (Results Pending)       EKG and Other Studies Reviewed on Admission:   EKG: NSR. HR 61.    ** Please Note: This note has been constructed using a voice recognition system. **    "

## 2024-08-30 NOTE — CASE MANAGEMENT
Case Management Discharge Planning Note    Patient name Edy Putnam  Location [facility] 705/[facility] 705-01 MRN [id number]  : 1945 Date 2024       Current Admission Date: 2024  Current Admission Diagnosis:Altered mental status, unspecified   Patient Active Problem List    Diagnosis Date Noted Date Diagnosed    Pneumococcal pneumonia (HCC) 2024     S/P splenectomy 2024     Altered mental status, unspecified 2024     Cerebrovascular accident (CVA) due to embolism of cerebral artery (HCC) 2024     Dementia (HCC) 2024       LOS (days): 0  Geometric Mean LOS (GMLOS) (days):   Days to GMLOS:     OBJECTIVE:            Current admission status: Observation   Preferred Pharmacy:   PATIENT/FAMILY REPORTS NO PREFERRED PHARMACY  No address on file      Primary Care Provider: No primary care provider on file.    Primary Insurance: [facility]  Secondary Insurance: MEDICARE    DISCHARGE DETAILS:    Additional Comments: Patient reviewed during care coordination rounds, therapy reporting that they are recommending STR at this time but it's possible that pt may advance to home with [facility]. CM called pt's spouse to discuss recommendation, pt's spouse requesting an update from physician prior to making a decision on discharge plan. Provider aware and will reach out to spouse to provide an update.

## 2024-08-30 NOTE — ASSESSMENT & PLAN NOTE
79 year old male with Hx of stroke in 2015 and dementia presented to ED with AMS, starting at approx 1100 on 8/29/24. Initial CTA Head performed in the ED showed microvascular changes and age indeterminate R temporal infarct. [facility]. Records not available via pending [facility] per [facility] team.     Lab Results   Component Value Date    CHOLESTEROL 178 08/30/2024    TRIG 70 08/30/2024    HDL 46 08/30/2024    LDLCALC 118 (H) 08/30/2024      Lab Results   Component Value Date    HGBA1C 5.9 (H) 08/30/2024      Lab Results   Component Value Date    HBR2ACPAMJAP 1.076 08/29/2024     - CTA H/N (8/29/2024): No LVO, high-grade stenosis, or aneurysm. No hemorrhage or mass effect. Severe chronic microvascular ischemic change and superimposed chronic infarcts.  - TTE (8/30/24): 60% EF, mild tricuspid regurgitation  - MRI Brain (8/30/2024): R temporal infarct with petechial hemorrhage, small acute L infarct within parietal periventricular white matter    Impression: MRI revealed acute R temporal infarct and small acute infarct of L pareital periventricular white matter. Continue aspirin and statin. Will have patient follow up with outpatient neurovascular team in 4-6 weeks. Will require outpatient cardiac rhythm monitoring.     Plan:  Case discussed with neurology attending Dr. Pedroza  Continue Aspirin 81 mg   Continue Atorvastatin 40 mg  Will need outpatient cardiac rhythm monitoring  Fall / Delirium Precautions  Normotensive goals  Euglycemic, normothermic goal  Continue telemetry  PT/OT/ST   Stroke education  Frequent neuro checks. Continue to monitor and notify neurology with any changes.  STAT CT head for any acute change in neuro exam  Medical management and supportive care per primary team. Correction of any metabolic or infectious disturbances.

## 2024-08-30 NOTE — ASSESSMENT & PLAN NOTE
Follows with [facility] and Neurologist Dr. Peters  Spouse reports that over the past several months patient has been more forgetful with regards to medications and not able

## 2024-08-30 NOTE — CASE MANAGEMENT
Case Management Assessment & Discharge Planning Note    Patient name Edy Putnam  Location [facility] 705/[facility] 705-01 MRN [id number]  : 1945 Date 2024       Current Admission Date: 2024  Current Admission Diagnosis:Altered mental status, unspecified   Patient Active Problem List    Diagnosis Date Noted Date Diagnosed    Pneumococcal pneumonia (HCC) 2024     S/P splenectomy 2024     Altered mental status, unspecified 2024     Cerebrovascular accident (CVA) due to embolism of cerebral artery (HCC) 2024     Dementia (HCC) 2024       LOS (days): 0  Geometric Mean LOS (GMLOS) (days):   Days to GMLOS:     OBJECTIVE:              Current admission status: Observation       Preferred Pharmacy:   PATIENT/FAMILY REPORTS NO PREFERRED PHARMACY  No address on file      Primary Care Provider: No primary care provider on file.    Primary Insurance: [facility]  Secondary Insurance: MEDICARE    ASSESSMENT:  Active Health Care Proxies       Tylor Putnam [facility] Care Representative - Spouse   Primary Phone: [phone] (Mobile)                 Advance Directives  Does patient have a Health Care POA?: Yes  Does patient have Advance Directives?: Yes  Advance Directives: Power of  for health care  Primary Contact: Tylor Putanm/Spouse ([phone])    Readmission Root Cause  30 Day Readmission: No    Patient Information  Admitted from:: Home  Mental Status: Confused  During Assessment patient was accompanied by: Spouse  Assessment information provided by:: Spouse  Primary Caregiver: Self  Support Systems: Spouse/significant other  County of Residence: Other (specify in comment box[place] ([place])  What city do you live in?: [place]  Home entry access options. Select all that apply.: Stairs  Number of steps to enter home.: 2  Do the steps have railings?: No  Type of Current Residence: 2 story home  Upon entering residence, is there a bedroom on the main  floor (no further steps)?: No  A bedroom is located on the following floor levels of residence (select all that apply):: 2nd Floor  Upon entering residence, is there a bathroom on the main floor (no further steps)?: No  Indicate which floors of current residence have a bathroom (select all the apply):: 2nd Floor  Number of steps to 2nd floor from main floor: One Flight  Living Arrangements: Lives w/ Spouse/significant other  Is patient a ?: Yes  [facility] [place]: Yes ([facility])  Is patient service connected?: Yes    Activities of Daily Living Prior to Admission  Functional Status: Independent  Completes ADLs independently?: Yes  Ambulates independently?: Yes  Does patient use assisted devices?: No  Does patient currently own DME?: Yes  What DME does the patient currently own?: Walker  Does patient have a history of Outpatient Therapy (PT/OT)?: Yes  Does the patient have a history of Short-Term Rehab?: No  Does patient have a history of HHC?: No  Does patient currently have HHC?: No    Patient Information Continued  Income Source: SSI/SSD  Does patient have prescription coverage?: Yes  Does patient have a history of substance abuse?: No  Does patient have a history of Mental Health Diagnosis?: No    Means of Transportation  Means of Transport to Appts:: Family transport      Social Determinants of Health (SDOH)      Flowsheet Row Most Recent Value   Housing Stability    In the last 12 months, was there a time when you were not able to pay the mortgage or rent on time? N   In the past 12 months, how many times have you moved where you were living? 0   At any time in the past 12 months, were you homeless or living in a shelter (including now)? N   Transportation Needs    In the past 12 months, has lack of transportation kept you from medical appointments or from getting medications? no   In the past 12 months, has lack of transportation kept you from meetings, work,  or from getting things needed for daily living? No   Food Insecurity    Within the past 12 months, you worried that your food would run out before you got the money to buy more. Never true   Within the past 12 months, the food you bought just didn't last and you didn't have money to get more. Never true   Utilities    In the past 12 months has the electric, gas, oil, or water company threatened to shut off services in your home? No            DISCHARGE DETAILS:    Discharge planning discussed with:: Pt's spouse, Tylor     KIMMIE contacted family/caregiver?: Yes (Assessment complete with spouse via phone)  Were Treatment Team discharge recommendations reviewed with patient/caregiver?: Yes  Did patient/caregiver verbalize understanding of patient care needs?: Yes  Were patient/caregiver advised of the risks associated with not following Treatment Team discharge recommendations?: Yes    Contacts  Patient Contacts: Tylor Putnam/Spouse  Relationship to Patient:: Family  Contact Method: Phone  Phone Number: [phone]  Reason/Outcome: Continuity of Care, Emergency Contact, Discharge Planning    Other Referral/Resources/Interventions Provided:  Referral Comments: PT/OT violet pending    Transport at Discharge : Family     Additional Comments: As pt is documented as being oriented x2 currently, CM called and spoke with pt's spouse, Tylor, to complete assessment. Pt and Tylor reside in a 2 story house that has 2 DAVID. Pt is reportedly independent with ADLs and ambulation, has access to a rolling walker but doesn't use. Pt is a 100% disabled  and PCP is through [facility]. Awaiting PT/CRISTINA lazo CM department to follow for recommendations.

## 2024-08-30 NOTE — PROGRESS NOTES
"[facility]  Progress Note  Name: Edy Putnam I  MRN: [id number]  Unit/Bed#: [facility] 705-01 I Date of Admission: 8/29/2024   Date of Service: 8/30/2024 I Hospital Day: 0    Assessment & Plan   * Altered mental status, unspecified  Assessment & Plan  Pt presents from home for evaluation of AMS starting at 1100 8/29.  No focal neurological deficits.   Baseline:patient able todo all chores around the house yesterday. Able to drive, cook, clean himself, pick out clothes  CTA head/ neck with no evidence of intercranial hemorrhage, Severe chronic microvascular ischemic change and superimposed chronic infarcts as detailed above. Age-indeterminate right temporal infarct, likely chronic.   EKG sinus without acute ischemic changes  CXR on my review without large infiltrate or effusion awaiting official read  MRI pending  Neurology onboard  Follow stroke pathway  Monitor on telemetry  Placed on ASA and high intensity statin  Neurology consulted  Fall and delirium precautions  PT/OT, case management for dispo planning    S/P splenectomy  Assessment & Plan  Prev splenectomy    Pneumococcal pneumonia (HCC)  Assessment & Plan  H/o Pneumococcal Pneumonia in the late 1980's   Patient had a splenectomy in the past    Dementia (HCC)  Assessment & Plan  Follows with [facility] and Neurologist Dr. Peters  Spouse reports that over the past several months patient has been more forgetful with regards to medications and not able     Cerebrovascular accident (CVA) due to embolism of cerebral artery (HCC)  Assessment & Plan  Wife reports a history of a \"[place]\" CVA in 2015 and patient has had short term memory deficits since then   Patient is reportedly on aspirin as well as \"simvastatin\" but wife unsure of medication doses  Aspiring, High Intensity statin   Obtain records form [facility] where patient follows with Dr. Peters (neurology)               VTE Pharmacologic Prophylaxis: VTE " Score: 9 High Risk (Score >/= 5) - Pharmacological DVT Prophylaxis Ordered: enoxaparin (Lovenox). Sequential Compression Devices Ordered.    Mobility:   Basic Mobility Inpatient Raw Score: 20  JH-HLM Goal: 6: Walk 10 steps or more  JH-HLM Achieved: 3: Sit at edge of bed  JH-HLM Goal achieved. Continue to encourage appropriate mobility.    Patient Centered Rounds: I performed bedside rounds with nursing staff today.   Discussions with Specialists or Other Care Team Provider: None    Education and Discussions with Family / Patient: Updated  (wife) via phone.    Total Time Spent on Date of Encounter in care of patient: 58 mins. This time was spent on one or more of the following: performing physical exam; counseling and coordination of care; obtaining or reviewing history; documenting in the medical record; reviewing/ordering tests, medications or procedures; communicating with other healthcare professionals and discussing with patient's family/caregivers.  First time seeing patient. Had lengthy discussion with family about history that is not readily available in the chart  Current Length of Stay: 0 day(s)  Current Patient Status: Observation   Certification Statement: The patient, admitted on an observation basis, will now require > 2 midnight hospital stay due to ams  Discharge Plan: Anticipate discharge in 24-48 hrs to home. Pending rehab eval    Code Status: Level 3 - DNAR and DNI    Subjective:   Patient does not report and headaches, shortness of breath, chest pain, abdominal pain, nausea vomiting, lower leg edema. Also reports good sleep  Spoke and updated spouse about the patient  No signs and symptoms of infection noted    Objective:     Vitals:   Temp (24hrs), Av.6 °F (36.4 °C), Min:97.2 °F (36.2 °C), Max:97.8 °F (36.6 °C)    Temp:  [97.2 °F (36.2 °C)-97.8 °F (36.6 °C)] 97.8 °F (36.6 °C)  HR:  [50-65] 54  Resp:  [13-20] 16  BP: (116-161)/(64-88) 142/70  SpO2:  [96 %-98 %] 98 %  Body mass  index is 24.53 kg/m².     Input and Output Summary (last 24 hours):     Intake/Output Summary (Last 24 hours) at 8/30/2024 0930  Last data filed at 8/30/2024 0451  Gross per 24 hour   Intake 0 ml   Output 200 ml   Net -200 ml       Physical Exam:   Physical Exam  Vitals and nursing note reviewed.   Constitutional:       Appearance: He is well-developed and normal weight.   HENT:      Head: Normocephalic and atraumatic.      Nose: Nose normal.      Mouth/Throat:      Mouth: Mucous membranes are moist.   Eyes:      Conjunctiva/sclera: Conjunctivae normal.   Cardiovascular:      Rate and Rhythm: Normal rate and regular rhythm.      Heart sounds: Normal heart sounds. No murmur heard.  Pulmonary:      Effort: Pulmonary effort is normal. No respiratory distress.      Breath sounds: Normal breath sounds.   Abdominal:      General: Abdomen is flat.      Palpations: Abdomen is soft.      Tenderness: There is no abdominal tenderness.   Musculoskeletal:         General: No swelling.      Cervical back: Neck supple.      Right lower leg: No edema.      Left lower leg: No edema.   Skin:     General: Skin is warm and dry.      Capillary Refill: Capillary refill takes less than 2 seconds.   Neurological:      General: No focal deficit present.      Mental Status: He is alert and oriented to person, place, and time. Mental status is at baseline.   Psychiatric:         Mood and Affect: Mood normal.          Additional Data:     Labs:  Results from last 7 days   Lab Units 08/30/24  0443   WBC Thousand/uL 9.77   HEMOGLOBIN g/dL 14.5   HEMATOCRIT % 43.4   PLATELETS Thousands/uL 353   SEGS PCT % 52   LYMPHO PCT % 30   MONO PCT % 12   EOS PCT % 5     Results from last 7 days   Lab Units 08/30/24  0443   SODIUM mmol/L 136   POTASSIUM mmol/L 3.8   CHLORIDE mmol/L 101   CO2 mmol/L 30   BUN mg/dL 14   CREATININE mg/dL 1.08   ANION GAP mmol/L 5   CALCIUM mg/dL 8.7   ALBUMIN g/dL 3.8   TOTAL BILIRUBIN mg/dL 1.70*   ALK PHOS U/L 52   ALT U/L 9    AST U/L 17   GLUCOSE RANDOM mg/dL 92     Results from last 7 days   Lab Units 08/30/24  0443   INR  1.02         Results from last 7 days   Lab Units 08/30/24  0443   HEMOGLOBIN A1C % 5.9*           Lines/Drains:  Invasive Devices       Peripheral Intravenous Line  Duration             Peripheral IV 08/29/24 Right Antecubital <1 day    Peripheral IV 08/30/24 Dorsal (posterior);Right Forearm <1 day                      Telemetry:  Telemetry Orders (From admission, onward)               24 Hour Telemetry Monitoring  Continuous x 24 Hours (Telem)        Question:  Reason for 24 Hour Telemetry  Answer:  TIA/Suspected CVA/ Confirmed CVA                     Telemetry Reviewed: Normal Sinus Rhythm  Indication for Continued Telemetry Use: No indication for continued use. Will discontinue.              Imaging: Reviewed radiology reports from this admission including: chest xray    Recent Cultures (last 7 days):         Last 24 Hours Medication List:   Current Facility-Administered Medications   Medication Dose Route Frequency Provider Last Rate    aspirin  81 mg Oral Daily Jesse Canseco DO      atorvastatin  40 mg Oral QPM Jesse Canseco DO      dextrose 5 % and sodium chloride 0.9 %  75 mL/hr Intravenous Continuous Jesse Canseco DO 75 mL/hr (08/30/24 0223)    OLANZapine  5 mg Intramuscular Q6H PRN Jesse Canseco DO          Today, Patient Was Seen By: Miki Addison MD    **Please Note: This note may have been constructed using a voice recognition system.**

## 2024-08-30 NOTE — OCCUPATIONAL THERAPY NOTE
"    Occupational Therapy Evaluation     Patient Name: Edy Putnam  Today's Date: 8/30/2024  Problem List  Principal Problem:    Altered mental status, unspecified  Active Problems:    Cerebrovascular accident (CVA) due to embolism of cerebral artery (HCC)    Dementia (HCC)    Pneumococcal pneumonia (HCC)    S/P splenectomy    Past Medical History  History reviewed. No pertinent past medical history.  Past Surgical History  History reviewed. No pertinent surgical history.      08/30/24 0940   OT Last Visit   OT Visit Date 08/30/24   Note Type   Note type Evaluation   Pain Assessment   Pain Assessment Tool 0-10   Pain Score No Pain   Restrictions/Precautions   Weight Bearing Precautions Per Order No   Other Precautions Telemetry;Cognitive;Chair Alarm;Bed Alarm;Fall Risk   Home Living   Type of Home House   Home Layout Laundry in basement;Stairs to enter with rails  (Per pt report \"a few steps to enter\")   Bathroom Shower/Tub Tub/shower unit   Bathroom Toilet Standard   Bathroom Equipment Grab bars in shower   Bathroom Accessibility Accessible   Home Equipment Cane;Walker   Prior Function   Level of Seth Independent with ADLs   Lives With Spouse   Receives Help From Family spouse, daughter   IADLs Independent with driving;Independent with meal prep;Independent with medication management   Falls in the last 6 months 0   Vocational Retired   Lifestyle   Autonomy I with ADL's/IADL's, no AD with functional mobiltiy, +drives   Reciprocal Relationships Spouse, daughter   Service to Others retired   Intrinsic Gratification doing homemaking tasks   General   Family/Caregiver Present No   ADL   Eating Assistance 5  Supervision/Setup   Grooming Assistance 5  Supervision/Setup   UB Bathing Assistance 5  Supervision/Setup   LB Bathing Assistance 4  Minimal Assistance   UB Dressing Assistance 5  Supervision/Setup   LB Dressing Assistance 4  Minimal Assistance   Toileting Assistance  4  Minimal Assistance   Bed Mobility "   Supine to Sit Unable to assess   Transfers   Sit to Stand 4  Minimal assistance   Additional items Assist x 1   Stand to Sit 4  Minimal assistance   Additional items Assist x 1   Additional Comments +min a with SBA of another for safety.   Functional Mobility   Functional Mobility 4  Minimal assistance   Additional Comments min a x 2 with HHA  short distance functional mobility to bathroom, for toileting   Balance   Static Sitting Fair   Dynamic Sitting Fair -   Static Standing Poor +   Dynamic Standing Poor   Ambulatory Poor -   Activity Tolerance   Activity Tolerance Patient limited by fatigue   Medical Staff Made Aware PT Cody, SALVADOR Garay due to the patient's co-morbidities, clinically unstable presentation, and present impairments which are a regression from the patient's baseline.    Nurse Made Aware RN cleared pt for therapy   RUE Assessment   RUE Assessment WFL   LUE Assessment   LUE Assessment WFL   Hand Function   Gross Motor Coordination Functional   Fine Motor Coordination Functional   Vision-Basic Assessment   Current Vision Wears glasses only for reading   Vision - Complex Assessment   Acuity Able to read clock/calendar on wall without difficulty   Cognition   Overall Cognitive Status (S)  Impaired  (Baseline STM deficits 2* CVA in 2015)   Arousal/Participation Alert;Responsive;Cooperative   Attention Attends with cues to redirect   Orientation Level Oriented to person;Oriented to place;Disoriented to time;Disoriented to situation   Memory Decreased recall of precautions;Decreased recall of recent events;Decreased short term memory   Following Commands Follows one step commands with increased time or repetition   Comments pt pleasant and cooperative, impaired STM, slow processing, mild impaired simple problem solving (locating soap with hand washing) with session. History of cogntive deficits at baseline>with admission. Pt would benefit from a formal cogntive evaluation 2* drives  (per pt  report/continue clarify)and increased confusion with admission.   Assessment   Limitation Decreased ADL status;Decreased Safe judgement during ADL;Decreased cognition;Decreased endurance;Decreased self-care trans;Decreased high-level ADLs   Prognosis Fair   Assessment Pt is a 79 y.o. male who was admitted to [facility] on 8/29/2024 with Altered mental status, unspecified s/p splenectomy, PNA, dementia. Patient  has no past medical history on file.   At baseline pt was completing I with ADL's/assistance with IADL's, no AD with functional mobility +drives. Pt lives spouse in a ranch style home with a few steps to enter. Currently pt requires min a  for overall ADLS and min/mod a without aD for functional mobility/transfers. Pt currently presents with impairments in the following categories -steps to enter environment, difficulty performing ADLS, difficulty performing IADLS , limited insight into deficits, compliance, flat affect, and decreased initiation and engagement  activity tolerance, endurance, standing balance/tolerance, sitting balance/tolerance, memory, insight, safety , judgement , attention , sequencing , communication, and interpersonal skills. These impairments, as well as pt's fatigue and risk for falls  limit pt's ability to safely engage in all baseline areas of occupation, includingdressing, toileting, functional mobility/transfers, community mobility, laundry , driving, house maintenance, medication management, meal prep, cleaning, social participation , and leisure activities  From OT standpoint, recommend mod level II vs home with min level III pending progress/social support upon D/C.The patient's raw score on the AM-PAC Daily Activity Inpatient Short Form is 18. A raw score of less than 19 suggests the patient may benefit from discharge to post-acute rehabilitation services. Please refer to the recommendation of the Occupational Therapist for safe discharge planning. OT will continue to  follow to address the below stated goals.   Goals   Patient Goals go to the bathroom   LTG Time Frame 10-14   Long Term Goal #1 see goals below   Plan   Treatment Interventions ADL retraining;Visual perceptual retraining;Functional transfer training;UE strengthening/ROM;Endurance training;Cognitive reorientation;Patient/family training;Compensatory technique education;Energy conservation;Activityengagement   Goal Expiration Date 09/13/24   OT Frequency 2-3x/wk   Discharge Recommendation   Rehab Resource Intensity Level, OT II (Moderate Resource Intensity)   Equipment Recommended (pending progressing/social support)   AM-PAC Daily Activity Inpatient   Lower Body Dressing 2   Bathing 2   Toileting 3   Upper Body Dressing 3   Grooming 4   Eating 4   Daily Activity Raw Score 18   Daily Activity Standardized Score (Calc for Raw Score >=11) 38.66   AM-PAC Applied Cognition Inpatient   Following a Speech/Presentation 2   Understanding Ordinary Conversation 4   Taking Medications 2   Remembering Where Things Are Placed or Put Away 2   Remembering List of 4-5 Errands 1   Taking Care of Complicated Tasks 1   Applied Cognition Raw Score 12   Applied Cognition Standardized Score 28.82   End of Consult   Patient Position at End of Consult All needs within reach;Bed/Chair alarm activated;Supine   Nurse Communication Nurse aware of consult      Occupational Therapy Goals:    *Mod I with bed mobility to engage in functional tasks.  *mod I Adl's after setup with use of AE PRN  *Mod I toileting and clothing management   *Mod I functional mobility and transfers to/from all surfaces with Fair + dynamic balance and safety for participation in dynamic adls and iadl tasks   *Demonstrate good carryover with safe use of RW during functional tasks   *Assess DME needs   *Increase activity tolerance to 25-30 minutes for participation in adls and enjoyable activities  *Pt to participate in further cognitive testing with good attention and  participation to assist with safe d/c recommendations  *Mod I with Simulated IADL management task.  *Demonstrate good carryover of pt/family education and training with good tolerance for increased safety and independence with ADL's/ADl's.  *Pt will improve standing balance to 4-5 minutes with functional tasks to increase I with toileting/transfers.  *Patient will demonstrate 100% carryover of energy conservation techniques t/o functional I/ADL/leisure tasks w/o cues s/p skilled education to increase endurance during functional tasks  *Patient will increase attention to follow multistep instructions with no cueing to increase cognitive function and independence with tasks    Marina Burgos OTR/L

## 2024-08-30 NOTE — ASSESSMENT & PLAN NOTE
"Wife reports a history of a \"Watershed\" CVA in 2015 and patient has had short term memory deficits since then   Patient is reportedly on aspirin as well as \"simvastatin\" but wife unsure of medication doses  Aspiring, High Intensity statin   Obtain records form [facility] where patient follows with Dr. Peters (neurology)  "

## 2024-08-30 NOTE — CONSULTS
NEUROLOGY RESIDENCY CONSULT NOTE     Name: Edy Putnam   Age & Sex: 79 y.o. male   MRN: [id number]  Unit/Bed#: [facility] 705-01   Encounter: [id number]  Length of Stay: 0    Edy Putnam will not need outpatient follow up with Neurology, as the [facility]  He will not require outpatient neurological testing.      Pending for discharge: MRI brain    ASSESSMENT & PLAN     * Altered mental status, unspecified  Assessment & Plan  79 year old male with Hx of CVA in 2015 and dementia presented to ED with AMS, starting at approx 1100 on 8/29/24. Initial CTA Head performed in the ED showed microvascular changes and age indeterminate R temporal infarct. [facility]. Records not available via pending [facility] per primary team.     Lab Results   Component Value Date    CHOLESTEROL 178 08/30/2024    TRIG 70 08/30/2024    HDL 46 08/30/2024    LDLCALC 118 (H) 08/30/2024      Lab Results   Component Value Date    HGBA1C 5.9 (H) 08/30/2024      Lab Results   Component Value Date    [id number] 1.076 08/29/2024     ECHO 8/30/24: 60% EF, mild tricuspid regurg    Plan:  Patient admitted on stroke pathway  MRI brain pending  Start Aspirin 81 mg   Start Atorvastatin 40 mg  Fall / Delirium Precautions  Permissive HTN, labetalol if SBP >200  Euglycemic, normothermic goal  Continue telemetry  PT/OT/ST eval  Stroke education  Frequent neuro checks. Continue to monitor and notify neurology with any changes.  STAT CT head for any acute change in neuro exam  Medical management and supportive care per primary team. Correction of any metabolic or infectious disturbances.               SUBJECTIVE     Reason for Consult / Principal Problem: Altered Mental Status  Hx and PE limited by: Patient is poor historian    HPI: Edy Putnam is a 79 y.o. right handed male with hx of CVA in 2015, dementia, and pneumococcal PNA s/p splenectomy who presents with  altered mental status. Per patient's wife, at approx 1100 on 8/29/24 patient became confused beyond baseline. At baseline, since CVA in 2015, patient has short-term memory loss. Initially in ED, VSS,  labs were benign, CTA Head showed microvascular changes and age-indeterminate R temporal infarct. In ED patient initally just oriented to self, gradually became agitated per ED team which limited the exam.    This AM, patient was seen and examined at bedside. US tech had just completed ECHO, for which patient was pleasantly compliant. On exam, patient oriented to self and place, but not situation or time. Patient is aware that [facility], but was unable to answer questions regarding his medical history. Patient currently has any complaints, no chest pain, shortness of breath, fever or chills.      Inpatient consult to Neurology  Consult performed by: Oscar Burks DO  Consult ordered by: Jesse Canseco DO        Historical Information   History reviewed. No pertinent past medical history.  History reviewed. No pertinent surgical history.  Social History   Social History     Substance and Sexual Activity   Alcohol Use None     Social History     Substance and Sexual Activity   Drug Use Not on file     E-Cigarette/Vaping     E-Cigarette/Vaping Substances     Social History     Tobacco Use   Smoking Status Not on file   Smokeless Tobacco Not on file     Family History: History reviewed. No pertinent family history.  Meds/Allergies   current meds:   Current Facility-Administered Medications   Medication Dose Route Frequency   • aspirin chewable tablet 81 mg  81 mg Oral Daily   • atorvastatin (LIPITOR) tablet 40 mg  40 mg Oral QPM   • OLANZapine (ZyPREXA) IM injection 5 mg  5 mg Intramuscular Q6H PRN    and PTA meds:   None     No Known Allergies    Review of previous medical records was completed.       Review of Systems   Psychiatric/Behavioral:  Positive for confusion.        OBJECTIVE     Patient  "ID: Edy Putnam is a 79 y.o. male.    Vitals:   Vitals:    24 0500 24 0700 24 0738 24 0800   BP: 116/69 138/66  142/70   Pulse: 65 (!) 50  (!) 54   Resp:       Temp:   97.8 °F (36.6 °C) 97.8 °F (36.6 °C)   TempSrc:    Oral   SpO2: 96% 97%  98%   Weight:    68.9 kg (152 lb)   Height:    5' 6\" (1.676 m)      Body mass index is 24.53 kg/m².     Intake/Output Summary (Last 24 hours) at 2024 1138  Last data filed at 2024 0451  Gross per 24 hour   Intake 0 ml   Output 200 ml   Net -200 ml       Temperature:   Temp (24hrs), Av.6 °F (36.4 °C), Min:97.2 °F (36.2 °C), Max:97.8 °F (36.6 °C)    Temperature: 97.8 °F (36.6 °C)    Invasive Devices:   Invasive Devices       Peripheral Intravenous Line  Duration             Peripheral IV 24 Right Antecubital <1 day    Peripheral IV 24 Dorsal (posterior);Right Forearm <1 day                    Physical Exam  Constitutional:       Appearance: Normal appearance.   HENT:      Head: Normocephalic and atraumatic.      Mouth/Throat:      Mouth: Mucous membranes are moist.      Pharynx: Oropharynx is clear.   Eyes:      Extraocular Movements: Extraocular movements intact.      Conjunctiva/sclera: Conjunctivae normal.      Pupils: Pupils are equal, round, and reactive to light.   Cardiovascular:      Rate and Rhythm: Normal rate.      Pulses: Normal pulses.   Pulmonary:      Effort: Pulmonary effort is normal.   Musculoskeletal:         General: No swelling, deformity or signs of injury. Normal range of motion.      Cervical back: Normal range of motion.   Neurological:      Mental Status: He is alert.      Cranial Nerves: No cranial nerve deficit.      Sensory: No sensory deficit.      Motor: No weakness.      Coordination: Finger-Nose-Finger Test normal.      Deep Tendon Reflexes: Reflexes normal.      Reflex Scores:       Bicep reflexes are 2+ on the right side and 2+ on the left side.       Brachioradialis reflexes are 2+ on the right " side and 2+ on the left side.       Patellar reflexes are 1+ on the right side and 1+ on the left side.     Comments: Oriented to person and place, not to time or situation.   Psychiatric:         Mood and Affect: Mood normal.         Behavior: Behavior normal.          Neurologic Exam     Mental Status   Disoriented to person.   Disoriented to place.   Disoriented to year, month and date.   Level of consciousness: alert    Cranial Nerves     CN II   Visual fields full to confrontation.     CN III, IV, VI   Pupils are equal, round, and reactive to light.  CN III: no CN III palsy  CN VI: no CN VI palsy    CN V   Facial sensation intact.     CN VII   Facial expression full, symmetric.     CN VIII   CN VIII normal.     CN IX, X   CN IX normal.   CN X normal.     CN XI   CN XI normal.     CN XII   CN XII normal.     Motor Exam   Muscle bulk: normal    Strength   Right deltoid: 5/5  Left deltoid: 5/5  Right biceps: 5/5  Left biceps: 5/5  Right triceps: 5/5  Left triceps: 5/5  Right quadriceps: 5/5  Left quadriceps: 5/5  Right hamstrin/5  Left hamstrin/5  Right gastroc: 5/5  Left gastroc: 5/5    Sensory Exam   Light touch normal.     Gait, Coordination, and Reflexes     Coordination   Finger to nose coordination: normal    Tremor   Resting tremor: absent  Intention tremor: absent    Reflexes   Right brachioradialis: 2+  Left brachioradialis: 2+  Right biceps: 2+  Left biceps: 2+  Right patellar: 1+  Left patellar: 1+           LABORATORY DATA     Labs: I have personally reviewed pertinent reports.    Results from last 7 days   Lab Units 24  0443 24  1830   WBC Thousand/uL 9.77 9.73   HEMOGLOBIN g/dL 14.5 14.7   HEMATOCRIT % 43.4 44.5   PLATELETS Thousands/uL 353 353   SEGS PCT % 52 55   MONO PCT % 12 11   EOS PCT % 5 5      Results from last 7 days   Lab Units 24  0443 24  1944   POTASSIUM mmol/L 3.8 4.1   CHLORIDE mmol/L 101 101   CO2 mmol/L 30 27   BUN mg/dL 14 17   CREATININE mg/dL 1.08  "1.13   CALCIUM mg/dL 8.7 8.7   ALK PHOS U/L 52 54   ALT U/L 9 10   AST U/L 17 16     Results from last 7 days   Lab Units 08/30/24  0443   MAGNESIUM mg/dL 2.2          Results from last 7 days   Lab Units 08/30/24  0443   INR  1.02   PTT seconds 26               IMAGING & DIAGNOSTIC TESTING     Radiology Results: I have personally reviewed pertinent reports.    CTA head and neck with and without contrast   Final Result by Royal Gardner DO (08/29 2123)      CT Brain:   No acute intracranial no hemorrhage or mass effect. Severe chronic microvascular ischemic change and superimposed chronic infarcts as detailed above. Age-indeterminate right temporal infarct, likely chronic. MRI brain should be considered for further    evaluation of this area and other potential small areas of infarction unseen on CT.      CT Angiography:   No evidence of large aneurysm, high-grade stenosis, or large vessel occlusion.      This study was marked for \"Immediate\" notification in EPIC.               Workstation performed: [id number]         XR chest 2 views   Final Result by Elier Muñoz MD (08/30 0733)      No acute cardiopulmonary disease.            Workstation performed: [id number]         MRI brain wo contrast    (Results Pending)       Other Diagnostic Testing: I have personally reviewed pertinent reports.      ACTIVE MEDICATIONS     Current Facility-Administered Medications   Medication Dose Route Frequency   • aspirin chewable tablet 81 mg  81 mg Oral Daily   • atorvastatin (LIPITOR) tablet 40 mg  40 mg Oral QPM   • OLANZapine (ZyPREXA) IM injection 5 mg  5 mg Intramuscular Q6H PRN       Prior to Admission medications    Not on File       CODE STATUS & ADVANCED DIRECTIVES     Code Status: Level 3 - DNAR and DNI  Advance Directive and Living Will:      Power of :    POLST:        VTE Pharmacologic Prophylaxis: Enoxaparin (Lovenox) (Not ordered)  VTE Mechanical Prophylaxis: sequential compression " device    ======    I have discussed the patient's history, physical exam findings, assessment, and plan in detail with attending, Dr. Pedroza    Thank you for allowing me to participate in the care of your patient, Edy Jersey.    Oscar Burks, DO  [facility] Neurology Residency, PGY-1

## 2024-08-30 NOTE — ASSESSMENT & PLAN NOTE
"Wife reports a history of a \"Watershed\" CVA in 2015 and patient has had short term memory deficits since then   Patient is reportedly on aspirin as well as \"simvastatin\" but wife unsure of medication doses  Aspiring, High Intensity statin   Obtain records form [facility] where patient follows with Dr. Peters (neurology)  "

## 2024-08-30 NOTE — ASSESSMENT & PLAN NOTE
79 year old male with Hx of CVA in 2015 and dementia presented to ED with AMS, starting at approx 1100 on 8/29/24. Initial CTA Head performed in the ED showed microvascular changes and age indeterminate R temporal infarct. [facility]. Records not available [facility].     Lab Results   Component Value Date    CHOLESTEROL 178 08/30/2024    TRIG 70 08/30/2024    HDL 46 08/30/2024    LDLCALC 118 (H) 08/30/2024      Lab Results   Component Value Date    HGBA1C 5.9 (H) 08/30/2024      Lab Results   Component Value Date    [id number] 1.076 08/29/2024     ECHO 8/30/24: 60% EF, mild tricuspid regurg    Plan:  Patient admitted on stroke pathway  MRI brain pending  Start Aspirin 81 mg   Start Atorvastatin 40 mg  Fall / Delirium Precautions  Permissive HTN, labetalol if SBP >200  Euglycemic, normothermic goal  Continue telemetry  PT/OT/ST eval  Stroke education  Frequent neuro checks. Continue to monitor and notify neurology with any changes.  STAT CT head for any acute change in neuro exam  Medical management and supportive care per primary team. Correction of any metabolic or infectious disturbances.

## 2024-08-30 NOTE — PLAN OF CARE
Problem: PHYSICAL THERAPY ADULT  Goal: Performs mobility at highest level of function for planned discharge setting.  See evaluation for individualized goals.  Description: Treatment/Interventions: Functional transfer training, LE strengthening/ROM, Elevations, Therapeutic exercise, Endurance training, Cognitive reorientation, Patient/family training, Equipment eval/education, Bed mobility, Gait training, Spoke to nursing, OT  Equipment Recommended: Other (Comment) (TBD)       See flowsheet documentation for full assessment, interventions and recommendations.  Note: Prognosis: Good  Problem List: Decreased strength, Decreased endurance, Impaired balance, Decreased mobility, Decreased coordination, Decreased cognition, Impaired judgement, Decreased safety awareness  Assessment: Pt is an 79 y.o. male presenting to [facility] on 8/29/24 for initial dx of altered mental status. Pt  has no past medical history on file. Active problems listed above. Pt presents as a high complexity evaluation due to Ongoing medical management for primary dx, Increased reliance on more restrictive AD compared to baseline, Decreased activity tolerance compared to baseline, Fall risk, Increased assistance needed from caregiver at current time, Ongoing telemetry monitoring, Cog status, Continuous pulse oximetry monitoring . Pt currently requires min Ax1 +SBA of 2nd for transfers, min Ax2 for ambulating 10'x2 with b/l HHA. PT is limited by cognition and deficits in strength, balance, mobility, endurance and activity tolerance limiting his ability to actively participate in his home or community. Pt presents below his functional baseline and will benefit from continued acute PT services to address deficits. Recommend level 2 resources pending progress in acute care. The patient's AM-PAC Basic Mobility Inpatient Short Form Raw Score is 14. A Raw score of less than 16 suggests the patient may benefit from discharge to post-acute rehabilitation  services. Please also refer to the recommendation of the Physical Therapist for safe discharge planning. Pt left upright in chair with chair alarm donned, call bell and personal items within reach and all needs met.  Barriers to Discharge: Inaccessible home environment, Decreased caregiver support     Rehab Resource Intensity Level, PT: II (Moderate Resource Intensity)    See flowsheet documentation for full assessment.

## 2024-08-30 NOTE — ASSESSMENT & PLAN NOTE
Pt presents from home for evaluation of AMS starting at 1100 today.  No focal neurological deficits.   CTA head/ neck with no evidence of intercranial hemorrhage, presence of chronic microvascular ischemic changes and age-indeterminate right temporal infarct  EKG sinus without acute ischemic changes  CXR on my review without large infiltrate or effusion awaiting official read  MRI pending  Follow stroke pathway  Monitor on telemetry  Placed on ASA and high intensity statin  Neurology consulted; formal eval to be completed in AM  Fall and delirium precautions  PT/OT, case management for dispo planning

## 2024-08-30 NOTE — RESTORATIVE TECHNICIAN NOTE
Restorative Technician Note      Patient Name: Edy Putnam     Note Type: Mobility  Patient Position Upon Consult: Bedside chair  Activity Performed: Ambulated; Dangled; Stood  Assistive Device: Other (Comment) (A x1)  Education Provided: Yes  Patient Position at End of Consult: Bedside chair; All needs within reach; Bed/Chair alarm activated    Amandeep MILLER, Restorative Technician, [facility]

## 2024-08-30 NOTE — SPEECH THERAPY NOTE
Speech Language/Pathology  Speech/Language Pathology  Assessment    Patient Name: Edy Putnam  Today's Date: 8/30/2024     Problem List  Principal Problem:    Altered mental status, unspecified  Active Problems:    Cerebrovascular accident (CVA) due to embolism of cerebral artery (HCC)    Dementia (HCC)    Pneumococcal pneumonia (HCC)    S/P splenectomy    Past Medical History  History reviewed. No pertinent past medical history.  Past Surgical History  History reviewed. No pertinent surgical history.     Bedside Swallow Evaluation:    Summary:  Pt presented w/ oral and pharyngeal stages of swallow WNL. Positioned upright OOB in recliner and fully alert. He fed himself chicken, penne pasta, and thin liquids via straw with single/successive sips. Mastication was min prolonged suspect to dry consistency of chicken however functional for current diet level. Bolus control, formation, and transfer were WNL. Swallows appeared prompt. No overt s/s of aspiration. Pt denied difficulties with chewing or swallowing po or medications. Stated his appetite fluctuates and will intermittently use dietary supplement. He is oriented to person/place/time. ST reviewed safe swallow strategies pt understood.     Recommendations:  Diet: Regular   Liquid:thin   Meds:As tolerated  Positioning:Upright  Strategies: slow rate, alternate liquids with solids, swallow prior to additional po   Pt to take PO/Meds only when fully alert and upright.   Oral care  Aspiration precautions  Reflux precautions  Eval only, No f/u tx indicated.     Consider consult w/:  Rehab  Nutrition     H&P/Admit info/ pertinent provider notes: (PMH noted above)  Edy Putnam is a 79 y.o. male with a PMH of CVA who presents from home with increasing confusion beginning yesterday.  Of note, pt no neurologic deficits other than short term memory loss since CVA per wife Work up in ED included CTA head/ neck which showed evidence of chronic infarcts with no acute  abnormality.  Will require MRI.  Lab work benign.  VSS.  On exam pt awake, alert and confused to place, time and situation.  Becoming increasingly agitated during encounter and unwilling to fully participate in exam. Admit to medicine service under observation to complete stroke work up.    Special Studies:  CTA NECK AND BRAIN WITH AND WITHOUT CONTRAST 08/29/2024  IMPRESSION:  CT Brain:  No acute intracranial no hemorrhage or mass effect. Severe chronic microvascular ischemic change and superimposed chronic infarcts as detailed above. Age-indeterminate right temporal infarct, likely chronic. MRI brain should be considered for further   evaluation of this area and other potential small areas of infarction unseen on CT.  CT Angiography:  No evidence of large aneurysm, high-grade stenosis, or large vessel occlusion.  XR CHEST PA AND LATERAL 08/29/2024  IMPRESSION:  No acute cardiopulmonary disease    Procalcitonin:    WBC: 9.77                  08/30/2024     Code Status: Level 3 DNR/DNI    Previous MBS: none    Patient's goal: none stated     Did the pt report pain? no  If yes, was nursing notified/was it addressed? N/a     Reason for consult:  R/o aspiration  Determine safest and least restrictive diet  Change in mental status    Precautions:  N/a    Food Allergies: No known    Current Diet: Regular and thin liquids    Premorbid diet: Regular and thin liquids    O2 requirement: Room air    Social/Prior living Lives with spouse    Voice/Speech: WNL   Follows commands: Basic    Cognitive status: Alert      Oral [facility] exam:  Dentition: Adequate   Lips (VII):WNL  Tongue (XII): midline   Mandible (V):adequate ROM  Face/oral sensation (V):symmetrical   Velum (X):WNL    Items administered:  Chicken, penne pasta, and thin liquids via straw with single/successive sips    Oral stage:  Lip closure:WNL  Mastication:WFL  Bolus formation:WNL  Bolus control:WNL  Transfer:WNL    Pharyngeal stage:  Swallow promptness:promt   Laryngeal  rise:adequate  No overt s/s aspiration    Esophageal stage:  No s/s reported    Results d/w:  Pt,

## 2024-08-30 NOTE — PLAN OF CARE
Problem: PAIN - ADULT  Goal: Verbalizes/displays adequate comfort level or baseline comfort level  Description: Interventions:  - Encourage patient to monitor pain and request assistance  - Assess pain using appropriate pain scale  - Administer analgesics based on type and severity of pain and evaluate response  - Implement non-pharmacological measures as appropriate and evaluate response  - Consider cultural and social influences on pain and pain management  - Notify physician/advanced practitioner if interventions unsuccessful or patient reports new pain  Outcome: Progressing     Problem: INFECTION - ADULT  Goal: Absence or prevention of progression during hospitalization  Description: INTERVENTIONS:  - Assess and monitor for signs and symptoms of infection  - Monitor lab/diagnostic results  - Monitor all insertion sites, i.e. indwelling lines, tubes, and drains  - Monitor endotracheal if appropriate and nasal secretions for changes in amount and color  - Gordon appropriate cooling/warming therapies per order  - Administer medications as ordered  - Instruct and encourage patient and family to use good hand hygiene technique  - Identify and instruct in appropriate isolation precautions for identified infection/condition  Outcome: Progressing     Problem: SAFETY ADULT  Goal: Patient will remain free of falls  Description: INTERVENTIONS:  - Educate patient/family on patient safety including physical limitations  - Instruct patient to call for assistance with activity   - Consult OT/PT to assist with strengthening/mobility   - Keep Call bell within reach  - Keep bed low and locked with side rails adjusted as appropriate  - Keep care items and personal belongings within reach  - Initiate and maintain comfort rounds  - Make Fall Risk Sign visible to staff  - Offer Toileting every 2 Hours, in advance of need  - Initiate/Maintain bed/chair alarm  - Obtain necessary fall risk management equipment:   - Apply yellow  socks and bracelet for high fall risk patients  - Consider moving patient to room near nurses station  Outcome: Progressing  Goal: Maintain or return to baseline ADL function  Description: INTERVENTIONS:  -  Assess patient's ability to carry out ADLs; assess patient's baseline for ADL function and identify physical deficits which impact ability to perform ADLs (bathing, care of mouth/teeth, toileting, grooming, dressing, etc.)  - Assess/evaluate cause of self-care deficits   - Assess range of motion  - Assess patient's mobility; develop plan if impaired  - Assess patient's need for assistive devices and provide as appropriate  - Encourage maximum independence but intervene and supervise when necessary  - Involve family in performance of ADLs  - Assess for home care needs following discharge   - Consider OT consult to assist with ADL evaluation and planning for discharge  - Provide patient education as appropriate  Outcome: Progressing  Goal: Maintains/Returns to pre admission functional level  Description: INTERVENTIONS:  - Perform AM-PAC 6 Click Basic Mobility/ Daily Activity assessment daily.  - Set and communicate daily mobility goal to care team and patient/family/caregiver.   - Collaborate with rehabilitation services on mobility goals if consulted  - Perform Range of Motion 3 times a day.  - Reposition patient every 2 hours.  - Dangle patient 3 times a day  - Stand patient 3 times a day  - Ambulate patient 3 times a day  - Out of bed to chair 3 times a day   - Out of bed for meals 3 times a day  - Out of bed for toileting  - Record patient progress and toleration of activity level   Outcome: Progressing     Problem: DISCHARGE PLANNING  Goal: Discharge to home or other facility with appropriate resources  Description: INTERVENTIONS:  - Identify barriers to discharge w/patient and caregiver  - Arrange for needed discharge resources and transportation as appropriate  - Identify discharge learning needs (meds,  wound care, etc.)  - Arrange for interpretive services to assist at discharge as needed  - Refer to Case Management Department for coordinating discharge planning if the patient needs post-hospital services based on physician/advanced practitioner order or complex needs related to functional status, cognitive ability, or social support system  Outcome: Progressing     Problem: Knowledge Deficit  Goal: Patient/family/caregiver demonstrates understanding of disease process, treatment plan, medications, and discharge instructions  Description: Complete learning assessment and assess knowledge base.  Interventions:  - Provide teaching at level of understanding  - Provide teaching via preferred learning methods  Outcome: Progressing     Problem: NEUROSENSORY - ADULT  Goal: Achieves stable or improved neurological status  Description: INTERVENTIONS  - Monitor and report changes in neurological status  - Monitor vital signs such as temperature, blood pressure, glucose, and any other labs ordered   - Initiate measures to prevent increased intracranial pressure  - Monitor for seizure activity and implement precautions if appropriate      Outcome: Progressing  Goal: Achieves maximal functionality and self care  Description: INTERVENTIONS  - Monitor swallowing and airway patency with patient fatigue and changes in neurological status  - Encourage and assist patient to increase activity and self care.   - Encourage visually impaired, hearing impaired and aphasic patients to use assistive/communication devices  Outcome: Progressing     Problem: CARDIOVASCULAR - ADULT  Goal: Maintains optimal cardiac output and hemodynamic stability  Description: INTERVENTIONS:  - Monitor I/O, vital signs and rhythm  - Monitor for S/S and trends of decreased cardiac output  - Administer and titrate ordered vasoactive medications to optimize hemodynamic stability  - Assess quality of pulses, skin color and temperature  - Assess for signs of  decreased coronary artery perfusion  - Instruct patient to report change in severity of symptoms  Outcome: Progressing  Goal: Absence of cardiac dysrhythmias or at baseline rhythm  Description: INTERVENTIONS:  - Continuous cardiac monitoring, vital signs, obtain 12 lead EKG if ordered  - Administer antiarrhythmic and heart rate control medications as ordered  - Monitor electrolytes and administer replacement therapy as ordered  Outcome: Progressing     Problem: METABOLIC, FLUID AND ELECTROLYTES - ADULT  Goal: Electrolytes maintained within normal limits  Description: INTERVENTIONS:  - Monitor labs and assess patient for signs and symptoms of electrolyte imbalances  - Administer electrolyte replacement as ordered  - Monitor response to electrolyte replacements, including repeat lab results as appropriate  - Instruct patient on fluid and nutrition as appropriate  Outcome: Progressing  Goal: Fluid balance maintained  Description: INTERVENTIONS:  - Monitor labs   - Monitor I/O and WT  - Instruct patient on fluid and nutrition as appropriate  - Assess for signs & symptoms of volume excess or deficit  Outcome: Progressing  Goal: Glucose maintained within target range  Description: INTERVENTIONS:  - Monitor Blood Glucose as ordered  - Assess for signs and symptoms of hyperglycemia and hypoglycemia  - Administer ordered medications to maintain glucose within target range  - Assess nutritional intake and initiate nutrition service referral as needed  Outcome: Progressing     Problem: SKIN/TISSUE INTEGRITY - ADULT  Goal: Skin Integrity remains intact(Skin Breakdown Prevention)  Description: Assess:  -Perform Jose assessment every shift  -Clean and moisturize skin every shift  -Inspect skin when repositioning, toileting, and assisting with ADLS  -Assess under medical devices such as  every shift  -Assess extremities for adequate circulation and sensation     Bed Management:  -Have minimal linens on bed & keep smooth,  unwrinkled  -Change linens as needed when moist or perspiring  -Avoid sitting or lying in one position for more than 2 hours while in bed  -Keep HOB at 45 degrees     Toileting:  -Offer bedside commode  -Assess for incontinence every 2 hours  -Use incontinent care products after each incontinent episode such as     Activity:  -Mobilize patient 3 times a day  -Encourage activity and walks on unit  -Encourage or provide ROM exercises   -Turn and reposition patient every 2 Hours  -Use appropriate equipment to lift or move patient in bed  -Instruct/ Assist with weight shifting every 2 when out of bed in chair  -Consider limitation of chair time 2 hour intervals    Skin Care:  -Avoid use of baby powder, tape, friction and shearing, hot water or constrictive clothing  -Relieve pressure over bony prominences using   -Do not massage red bony areas    Next Steps:  -Teach patient strategies to minimize risks such as    -Consider consults to  interdisciplinary teams such as   Outcome: Progressing  Goal: Incision(s), wounds(s) or drain site(s) healing without S/S of infection  Description: INTERVENTIONS  - Assess and document dressing, incision, wound bed, drain sites and surrounding tissue  - Provide patient and family education  - Perform skin care/dressing changes every shift  Outcome: Progressing  Goal: Pressure injury heals and does not worsen  Description: Interventions:  - Implement low air loss mattress or specialty surface (Criteria met)  - Apply silicone foam dressing  - Instruct/assist with weight shifting every 2 minutes when in chair   - Limit chair time to 2 hour intervals  - Use special pressure reducing interventions such as  when in chair   - Apply fecal or urinary incontinence containment device   - Perform passive or active ROM every shift  - Turn and reposition patient & offload bony prominences every 2 hours   - Utilize friction reducing device or surface for transfers   - Consider consults to   interdisciplinary teams such as   - Use incontinent care products after each incontinent episode such as   - Consider nutrition services referral as needed  Outcome: Progressing     Problem: MUSCULOSKELETAL - ADULT  Goal: Maintain or return mobility to safest level of function  Description: INTERVENTIONS:  - Assess patient's ability to carry out ADLs; assess patient's baseline for ADL function and identify physical deficits which impact ability to perform ADLs (bathing, care of mouth/teeth, toileting, grooming, dressing, etc.)  - Assess/evaluate cause of self-care deficits   - Assess range of motion  - Assess patient's mobility  - Assess patient's need for assistive devices and provide as appropriate  - Encourage maximum independence but intervene and supervise when necessary  - Involve family in performance of ADLs  - Assess for home care needs following discharge   - Consider OT consult to assist with ADL evaluation and planning for discharge  - Provide patient education as appropriate  Outcome: Progressing  Goal: Maintain proper alignment of affected body part  Description: INTERVENTIONS:  - Support, maintain and protect limb and body alignment  - Provide patient/ family with appropriate education  Outcome: Progressing

## 2024-08-31 PROBLEM — Z86.73 HISTORY OF STROKE: Status: ACTIVE | Noted: 2024-08-29

## 2024-08-31 PROBLEM — I63.9 ACUTE STROKE DUE TO ISCHEMIA (HCC): Status: ACTIVE | Noted: 2024-08-29

## 2024-08-31 PROBLEM — E53.8 B12 DEFICIENCY: Status: ACTIVE | Noted: 2024-08-31

## 2024-08-31 LAB
ANION GAP SERPL CALCULATED.3IONS-SCNC: 6 MMOL/L (ref 4–13)
BUN SERPL-MCNC: 14 MG/DL (ref 5–25)
CALCIUM SERPL-MCNC: 8.3 MG/DL (ref 8.4–10.2)
CHLORIDE SERPL-SCNC: 105 MMOL/L (ref 96–108)
CO2 SERPL-SCNC: 26 MMOL/L (ref 21–32)
CREAT SERPL-MCNC: 1 MG/DL (ref 0.6–1.3)
ERYTHROCYTE [DISTWIDTH] IN BLOOD BY AUTOMATED COUNT: 14.7 % (ref 11.6–15.1)
GFR SERPL CREATININE-BSD FRML MDRD: 72 ML/MIN/1.73SQ M
GLUCOSE SERPL-MCNC: 114 MG/DL (ref 65–140)
HCT VFR BLD AUTO: 43 % (ref 36.5–49.3)
HGB BLD-MCNC: 14.4 G/DL (ref 12–17)
MCH RBC QN AUTO: 31.3 PG (ref 26.8–34.3)
MCHC RBC AUTO-ENTMCNC: 33.5 G/DL (ref 31.4–37.4)
MCV RBC AUTO: 94 FL (ref 82–98)
PLATELET # BLD AUTO: 357 THOUSANDS/UL (ref 149–390)
PMV BLD AUTO: 9.1 FL (ref 8.9–12.7)
POTASSIUM SERPL-SCNC: 3.9 MMOL/L (ref 3.5–5.3)
RBC # BLD AUTO: 4.6 MILLION/UL (ref 3.88–5.62)
SODIUM SERPL-SCNC: 137 MMOL/L (ref 135–147)
WBC # BLD AUTO: 8.77 THOUSAND/UL (ref 4.31–10.16)

## 2024-08-31 PROCEDURE — 80048 BASIC METABOLIC PNL TOTAL CA: CPT

## 2024-08-31 PROCEDURE — 99233 SBSQ HOSP IP/OBS HIGH 50: CPT | Performed by: STUDENT IN AN ORGANIZED HEALTH CARE EDUCATION/TRAINING PROGRAM

## 2024-08-31 PROCEDURE — 99232 SBSQ HOSP IP/OBS MODERATE 35: CPT

## 2024-08-31 PROCEDURE — 85027 COMPLETE CBC AUTOMATED: CPT

## 2024-08-31 RX ORDER — CYANOCOBALAMIN 1000 UG/ML
1000 INJECTION, SOLUTION INTRAMUSCULAR; SUBCUTANEOUS WEEKLY
Status: DISCONTINUED | OUTPATIENT
Start: 2024-08-31 | End: 2024-09-09

## 2024-08-31 RX ADMIN — SODIUM CHLORIDE 100 ML/HR: 0.9 INJECTION, SOLUTION INTRAVENOUS at 22:10

## 2024-08-31 RX ADMIN — CYANOCOBALAMIN 1000 MCG: 1000 INJECTION, SOLUTION INTRAMUSCULAR; SUBCUTANEOUS at 11:58

## 2024-08-31 RX ADMIN — ASPIRIN 81 MG CHEWABLE TABLET 81 MG: 81 TABLET CHEWABLE at 07:32

## 2024-08-31 RX ADMIN — ATORVASTATIN CALCIUM 40 MG: 40 TABLET, FILM COATED ORAL at 16:50

## 2024-08-31 RX ADMIN — SODIUM CHLORIDE 100 ML/HR: 0.9 INJECTION, SOLUTION INTRAVENOUS at 02:39

## 2024-08-31 RX ADMIN — SODIUM CHLORIDE 100 ML/HR: 0.9 INJECTION, SOLUTION INTRAVENOUS at 12:06

## 2024-08-31 NOTE — ASSESSMENT & PLAN NOTE
Pt presents from home for evaluation of AMS starting at 1100 8/29.  No focal neurological deficits.   Baseline:patient able todo all chores around the house yesterday. Able to drive, cook, clean himself, pick out clothes  CTA head/ neck with no evidence of intercranial hemorrhage, Severe chronic microvascular ischemic change and superimposed chronic infarcts as detailed above. Age-indeterminate right temporal infarct, likely chronic.   EKG sinus without acute ischemic changes  CXR on my review without large infiltrate or effusion awaiting official read  MRI 8/31: Acute infarct involving the right temporal lobe with petechial hemorrhage.  -Small area of acute infarct within the left parietal periventricular white matter.  No clot on echo, tele was normal sinus no events    Neurology onboard  Follow stroke pathway  Monitor on telemetry  Placed on ASA and high intensity statin  Possible loop recorder/zio as op  Fall and delirium precautions  Need placement for PT.  PT/OT, case management for dispo planning

## 2024-08-31 NOTE — ASSESSMENT & PLAN NOTE
"Wife reports a history of a \"Watershed\" CVA in 2015 and patient has had short term memory deficits since then   Patient is reportedly on aspirin as well as \"simvastatin\" but wife unsure of medication doses  Aspiring, High Intensity statin   Obtain records form [facility] where patient follows with Dr. Peters (neurology)  "

## 2024-08-31 NOTE — ASSESSMENT & PLAN NOTE
B12 241    Plan  Started B12 injections to continue once weekly for 4 weeks, then afterwards once monthly  Started B12 1000 mcg once daily

## 2024-08-31 NOTE — ASSESSMENT & PLAN NOTE
Follows with [facility] and Neurologist Dr. Peters  Spouse reports that over the past several months patient has been more forgetful with regards to medications and not able

## 2024-08-31 NOTE — PLAN OF CARE
Problem: NEUROSENSORY - ADULT  Goal: Achieves stable or improved neurological status  Description: INTERVENTIONS  - Monitor and report changes in neurological status  - Monitor vital signs such as temperature, blood pressure, glucose, and any other labs ordered   - Initiate measures to prevent increased intracranial pressure  - Monitor for seizure activity and implement precautions if appropriate      Outcome: Progressing     Problem: SAFETY ADULT  Goal: Patient will remain free of falls  Description: INTERVENTIONS:  - Educate patient/family on patient safety including physical limitations  - Instruct patient to call for assistance with activity   - Consult OT/PT to assist with strengthening/mobility   - Keep Call bell within reach  - Keep bed low and locked with side rails adjusted as appropriate  - Keep care items and personal belongings within reach  - Initiate and maintain comfort rounds  - Make Fall Risk Sign visible to staff  - Offer Toileting every 2 Hours, in advance of need  - Initiate/Maintain bed/chair alarm  - Obtain necessary fall risk management equipment.  - Apply yellow socks and bracelet for high fall risk patients  - Consider moving patient to room near nurses station  Outcome: Progressing

## 2024-08-31 NOTE — PROGRESS NOTES
"[facility]  Progress Note  Name: Edy Putnam I  MRN: [id number]  Unit/Bed#: [facility] 705-01 I Date of Admission: 8/29/2024   Date of Service: 8/31/2024 I Hospital Day: 1    Assessment & Plan   * Acute stroke due to ischemia (HCC)  Assessment & Plan  Pt presents from home for evaluation of AMS starting at 1100 8/29.  No focal neurological deficits.   Baseline:patient able todo all chores around the house yesterday. Able to drive, cook, clean himself, pick out clothes  CTA head/ neck with no evidence of intercranial hemorrhage, Severe chronic microvascular ischemic change and superimposed chronic infarcts as detailed above. Age-indeterminate right temporal infarct, likely chronic.   EKG sinus without acute ischemic changes  CXR on my review without large infiltrate or effusion awaiting official read  MRI 8/31: Acute infarct involving the right temporal lobe with petechial hemorrhage.  -Small area of acute infarct within the left parietal periventricular white matter.  No clot on echo, tele was normal sinus no events    Neurology onboard  Follow stroke pathway  Monitor on telemetry  Placed on ASA and high intensity statin  Possible loop recorder/zio as op  Fall and delirium precautions  Need placement for PT.  PT/OT, case management for dispo planning    S/P splenectomy  Assessment & Plan  Prev splenectomy    Pneumococcal pneumonia (HCC)  Assessment & Plan  H/o Pneumococcal Pneumonia in the late 1980's   Patient had a splenectomy in the past    Dementia (HCC)  Assessment & Plan  Follows with [facility] and Neurologist Dr. Peters  Spouse reports that over the past several months patient has been more forgetful with regards to medications and not able     History of stroke  Assessment & Plan  Wife reports a history of a \"Watershed\" CVA in 2015 and patient has had short term memory deficits since then   Patient is reportedly on aspirin as well as \"simvastatin\" but wife unsure of " medication doses  Aspiring, High Intensity statin   Obtain records form [facility] where patient follows with Dr. Peters (neurology)               VTE Pharmacologic Prophylaxis: VTE Score: 9 High Risk (Score >/= 5) - Pharmacological DVT Prophylaxis Ordered: enoxaparin (Lovenox). Sequential Compression Devices Ordered.    Mobility:   Basic Mobility Inpatient Raw Score: 14  JH-HLM Goal: 4: Move to chair/commode  JH-HLM Achieved: 6: Walk 10 steps or more  JH-HLM Goal achieved. Continue to encourage appropriate mobility.    Patient Centered Rounds: I performed bedside rounds with nursing staff today.   Discussions with Specialists or Other Care Team Provider: None    Education and Discussions with Family / Patient: Updated  (wife) via phone.    Total Time Spent on Date of Encounter in care of patient: 40 mins. This time was spent on one or more of the following: performing physical exam; counseling and coordination of care; obtaining or reviewing history; documenting in the medical record; reviewing/ordering tests, medications or procedures; communicating with other healthcare professionals and discussing with patient's family/caregivers.    Current Length of Stay: 1 day(s)  Current Patient Status: Inpatient   Certification Statement: The patient, admitted on an observation basis, will now require > 2 midnight hospital stay due to ams  Discharge Plan: Anticipate discharge in 24-48 hrs to home. Pending rehab eval    Code Status: Level 3 - DNAR and DNI    Subjective:   Patient still confused.  Only oriented to self.  Did not complain of any pain, had good diet.  No other subjective complaints noted  Objective:     Vitals:   Temp (24hrs), Av.9 °F (36.6 °C), Min:97.6 °F (36.4 °C), Max:98.4 °F (36.9 °C)    Temp:  [97.6 °F (36.4 °C)-98.4 °F (36.9 °C)] 97.9 °F (36.6 °C)  HR:  [55-59] 57  Resp:  [16] 16  BP: (123-133)/(67-68) 133/68  SpO2:  [91 %-100 %] 96 %  Body mass index is 24.53 kg/m².      Input and Output Summary (last 24 hours):     Intake/Output Summary (Last 24 hours) at 8/31/2024 1144  Last data filed at 8/31/2024 0702  Gross per 24 hour   Intake 236 ml   Output --   Net 236 ml       Physical Exam:   Physical Exam  Vitals and nursing note reviewed.   Constitutional:       Appearance: He is well-developed and normal weight.   HENT:      Head: Normocephalic and atraumatic.      Nose: Nose normal.      Mouth/Throat:      Mouth: Mucous membranes are moist.   Eyes:      Conjunctiva/sclera: Conjunctivae normal.   Cardiovascular:      Rate and Rhythm: Normal rate and regular rhythm.      Heart sounds: Normal heart sounds. No murmur heard.  Pulmonary:      Effort: Pulmonary effort is normal. No respiratory distress.      Breath sounds: Normal breath sounds.   Abdominal:      General: Abdomen is flat.      Palpations: Abdomen is soft.      Tenderness: There is no abdominal tenderness.   Musculoskeletal:         General: No swelling.      Cervical back: Neck supple.      Right lower leg: No edema.      Left lower leg: No edema.   Skin:     General: Skin is warm and dry.      Capillary Refill: Capillary refill takes less than 2 seconds.   Neurological:      General: No focal deficit present.      Mental Status: He is alert and oriented to person, place, and time. Mental status is at baseline.   Psychiatric:         Mood and Affect: Mood normal.          Additional Data:     Labs:  Results from last 7 days   Lab Units 08/31/24  0806 08/30/24  0443   WBC Thousand/uL 8.77 9.77   HEMOGLOBIN g/dL 14.4 14.5   HEMATOCRIT % 43.0 43.4   PLATELETS Thousands/uL 357 353   SEGS PCT %  --  52   LYMPHO PCT %  --  30   MONO PCT %  --  12   EOS PCT %  --  5     Results from last 7 days   Lab Units 08/31/24  0806 08/30/24  0443   SODIUM mmol/L 137 136   POTASSIUM mmol/L 3.9 3.8   CHLORIDE mmol/L 105 101   CO2 mmol/L 26 30   BUN mg/dL 14 14   CREATININE mg/dL 1.00 1.08   ANION GAP mmol/L 6 5   CALCIUM mg/dL 8.3* 8.7    ALBUMIN g/dL  --  3.8   TOTAL BILIRUBIN mg/dL  --  1.70*   ALK PHOS U/L  --  52   ALT U/L  --  9   AST U/L  --  17   GLUCOSE RANDOM mg/dL 114 92     Results from last 7 days   Lab Units 08/30/24  0443   INR  1.02         Results from last 7 days   Lab Units 08/30/24  0443   HEMOGLOBIN A1C % 5.9*           Lines/Drains:  Invasive Devices       Peripheral Intravenous Line  Duration             Peripheral IV 08/29/24 Right Antecubital 1 day    Peripheral IV 08/30/24 Dorsal (posterior);Right Forearm 1 day                      Telemetry:  Telemetry Orders (From admission, onward)               24 Hour Telemetry Monitoring  Continuous x 24 Hours (Telem)        Question:  Reason for 24 Hour Telemetry  Answer:  TIA/Suspected CVA/ Confirmed CVA                     Telemetry Reviewed: Normal Sinus Rhythm  Indication for Continued Telemetry Use: No indication for continued use. Will discontinue.              Imaging: Reviewed radiology reports from this admission including: chest xray    Recent Cultures (last 7 days):         Last 24 Hours Medication List:   Current Facility-Administered Medications   Medication Dose Route Frequency Provider Last Rate    aspirin  81 mg Oral Daily Jesse Canseco DO      atorvastatin  40 mg Oral QPM Jesse Canseco DO      vitamin B-12  1,000 mcg Oral Daily Adam Severino, DO      cyanocobalamin  1,000 mcg Intramuscular Weekly Adam Severino, DO      OLANZapine  5 mg Intramuscular Q6H PRN Jesse Canseco DO      sodium chloride  100 mL/hr Intravenous Continuous Miki Addison  mL/hr (08/31/24 0113)        Today, Patient Was Seen By: Miki Addison MD    **Please Note: This note may have been constructed using a voice recognition system.**

## 2024-08-31 NOTE — PLAN OF CARE
Problem: Knowledge Deficit  Goal: Patient/family/caregiver demonstrates understanding of disease process, treatment plan, medications, and discharge instructions  Description: Complete learning assessment and assess knowledge base.  Interventions:  - Provide teaching at level of understanding  - Provide teaching via preferred learning methods  Outcome: Progressing     Problem: SAFETY ADULT  Goal: Patient will remain free of falls  Description: INTERVENTIONS:  - Educate patient/family on patient safety including physical limitations  - Instruct patient to call for assistance with activity   - Consult OT/PT to assist with strengthening/mobility   - Keep Call bell within reach  - Keep bed low and locked with side rails adjusted as appropriate  - Keep care items and personal belongings within reach  - Initiate and maintain comfort rounds  - Make Fall Risk Sign visible to staff  - Offer Toileting every 2 Hours, in advance of need  - Initiate/Maintain bed alarm  - Obtain necessary fall risk management equipment:   - Apply yellow socks and bracelet for high fall risk patients  - Consider moving patient to room near nurses station  Outcome: Progressing

## 2024-09-01 PROCEDURE — 99232 SBSQ HOSP IP/OBS MODERATE 35: CPT

## 2024-09-01 RX ADMIN — CYANOCOBALAMIN TAB 500 MCG 1000 MCG: 500 TAB at 08:50

## 2024-09-01 RX ADMIN — ASPIRIN 81 MG CHEWABLE TABLET 81 MG: 81 TABLET CHEWABLE at 08:50

## 2024-09-01 RX ADMIN — SODIUM CHLORIDE 100 ML/HR: 0.9 INJECTION, SOLUTION INTRAVENOUS at 06:33

## 2024-09-01 RX ADMIN — ATORVASTATIN CALCIUM 40 MG: 40 TABLET, FILM COATED ORAL at 17:01

## 2024-09-01 NOTE — PLAN OF CARE
Problem: SAFETY ADULT  Goal: Patient will remain free of falls  Description: INTERVENTIONS:  - Educate patient/family on patient safety including physical limitations  - Instruct patient to call for assistance with activity   - Consult OT/PT to assist with strengthening/mobility   - Keep Call bell within reach  - Keep bed low and locked with side rails adjusted as appropriate  - Keep care items and personal belongings within reach  - Initiate and maintain comfort rounds  - Make Fall Risk Sign visible to staff  - Offer Toileting every 2 Hours, in advance of need  - Initiate/Maintain bed/chair alarm  - Obtain necessary fall risk management equipment.  - Apply yellow socks and bracelet for high fall risk patients  - Consider moving patient to room near nurses station  Outcome: Progressing     Problem: Knowledge Deficit  Goal: Patient/family/caregiver demonstrates understanding of disease process, treatment plan, medications, and discharge instructions  Description: Complete learning assessment and assess knowledge base.  Interventions:  - Provide teaching at level of understanding  - Provide teaching via preferred learning methods  Outcome: Progressing

## 2024-09-01 NOTE — ASSESSMENT & PLAN NOTE
Follows with [facility] and Neurologist Dr. Peters  Possible vascular dementia  Step down progression with most recent stroke. Worse short term memory per family  Spouse reports that over the past several months patient has been more forgetful with regards to medications and not able

## 2024-09-01 NOTE — CASE MANAGEMENT
Case Management Discharge Planning Note    Patient name Edy Putnam  Location [facility] 705/[facility] 705-01 MRN [id number]  : 1945 Date 2024       Current Admission Date: 2024  Current Admission Diagnosis:Acute stroke due to ischemia (HCC)   Patient Active Problem List    Diagnosis Date Noted Date Diagnosed    B12 deficiency 2024     Pneumococcal pneumonia (HCC) 2024     S/P splenectomy 2024     Acute stroke due to ischemia (HCC) 2024     History of stroke 2024     Dementia (HCC) 2024       LOS (days): 2  Geometric Mean LOS (GMLOS) (days):   Days to GMLOS:     OBJECTIVE:  Risk of Unplanned Readmission Score: 10.14         Current admission status: Inpatient   Preferred Pharmacy:   PATIENT/FAMILY REPORTS NO PREFERRED PHARMACY  No address on file      Primary Care Provider: No primary care provider on file.    Primary Insurance: [facility]  Secondary Insurance: MEDICARE    DISCHARGE DETAILS:    Discharge planning discussed with:: spouse, Tylor  Freedom of Choice: Yes  Comments - Freedom of Choice: Agreeable to STR referrals  CM contacted family/caregiver?: Yes (Tylor)  Were Treatment Team discharge recommendations reviewed with patient/caregiver?: Yes  Did patient/caregiver verbalize understanding of patient care needs?: N/A- going to facility  Were patient/caregiver advised of the risks associated with not following Treatment Team discharge recommendations?: Yes    Contacts  Patient Contacts: Tylor Latoyalexus/Spouse  Contact Method: Phone  Phone Number: [phone]  Reason/Outcome: Continuity of Care, Emergency Contact, Discharge Planning  Other Referral/Resources/Interventions Provided:  Interventions: Short Term Rehab  Referral Comments: [place] referrals placed for STR centered around [place] per yTlor's request.         Treatment Team Recommendation: Short Term Rehab  Discharge Destination Plan:: Short Term Rehab      Additional Comments: KIMMIE  spoke to Tylor who was agreeable to CM placing STR referrals at this time.  CM sent referrals. Pt will require insurance auth due to [facility] insurance.

## 2024-09-01 NOTE — PROGRESS NOTES
"[facility]  Progress Note  Name: Edy Putnam I  MRN: [id number]  Unit/Bed#: [facility] 705-01 I Date of Admission: 8/29/2024   Date of Service: 9/1/2024 I Hospital Day: 2    Assessment & Plan   * Acute stroke due to ischemia (HCC)  Assessment & Plan  Pt presents from home for evaluation of AMS starting at 1100 8/29.  No focal neurological deficits.   Baseline:patient able todo all chores around the house yesterday. Able to drive, cook, clean himself, pick out clothes  CTA head/ neck with no evidence of intercranial hemorrhage, Severe chronic microvascular ischemic change and superimposed chronic infarcts as detailed above. Age-indeterminate right temporal infarct, likely chronic.   EKG sinus without acute ischemic changes  CXR on my review without large infiltrate or effusion awaiting official read  MRI 8/31: Acute infarct involving the right temporal lobe with petechial hemorrhage.  -Small area of acute infarct within the left parietal periventricular white matter.  Patient still oriented only to self. Incorrectly named president. Doesn't eremember why he is in hospital.   No clot on echo, tele was normal sinus no events  Monitor on telemetry  Placed on ASA and high intensity statin  loop recorder/zio as op  Fall and delirium precautions  Need placement for PT.  Pending Placement  Medically clear for discharge    Pneumococcal pneumonia (HCC)  Assessment & Plan  H/o Pneumococcal Pneumonia in the late 1980's   Patient had a splenectomy in the past    Dementia (HCC)  Assessment & Plan  Follows with [facility] and Neurologist Dr. Peters  Possible vascular dementia  Step down progression with most recent stroke. Worse short term memory per family  Spouse reports that over the past several months patient has been more forgetful with regards to medications and not able     History of stroke  Assessment & Plan  Wife reports a history of a \"Watershed\" CVA in 2015 and patient has had " "short term memory deficits since then   Patient is reportedly on aspirin as well as \"simvastatin\" but wife unsure of medication doses  Aspiring, High Intensity statin   Obtain records form [facility] where patient follows with Dr. Peters (neurology)               VTE Pharmacologic Prophylaxis: VTE Score: 9 High Risk (Score >/= 5) - Pharmacological DVT Prophylaxis Ordered: enoxaparin (Lovenox). Sequential Compression Devices Ordered.    Mobility:   Basic Mobility Inpatient Raw Score: 20  JH-HLM Goal: 6: Walk 10 steps or more  JH-HLM Achieved: 6: Walk 10 steps or more  JH-HLM Goal achieved. Continue to encourage appropriate mobility.    Patient Centered Rounds: I performed bedside rounds with nursing staff today.   Discussions with Specialists or Other Care Team Provider: None    Education and Discussions with Family / Patient: Updated  (wife) via phone.    Total Time Spent on Date of Encounter in care of patient: 40 mins. This time was spent on one or more of the following: performing physical exam; counseling and coordination of care; obtaining or reviewing history; documenting in the medical record; reviewing/ordering tests, medications or procedures; communicating with other healthcare professionals and discussing with patient's family/caregivers.    Current Length of Stay: 2 day(s)  Current Patient Status: Inpatient   Certification Statement: The patient will continue to require additional inpatient hospital stay due to Placement  Discharge Plan: Anticipate discharge in 24-48 hrs to home. Pending rehab placement    Code Status: Level 3 - DNAR and DNI    Subjective:   Patient still confused.  Only oriented to self.  Did not complain of any pain, had good diet.  No other subjective complaints noted  Objective:     Vitals:   Temp (24hrs), Av °F (36.7 °C), Min:97.7 °F (36.5 °C), Max:98.2 °F (36.8 °C)    Temp:  [97.7 °F (36.5 °C)-98.2 °F (36.8 °C)] 98.2 °F (36.8 °C)  HR:  [71-74] 72  Resp: "  [12-16] 14  BP: (122-123)/(66-71) 122/66  SpO2:  [95 %-97 %] 97 %  Body mass index is 24.53 kg/m².     Input and Output Summary (last 24 hours):     Intake/Output Summary (Last 24 hours) at 9/1/2024 1416  Last data filed at 9/1/2024 0301  Gross per 24 hour   Intake 240 ml   Output 430 ml   Net -190 ml       Physical Exam:   Physical Exam  Vitals and nursing note reviewed.   Constitutional:       Appearance: He is well-developed and normal weight.   HENT:      Head: Normocephalic and atraumatic.      Nose: Nose normal.      Mouth/Throat:      Mouth: Mucous membranes are moist.   Eyes:      Conjunctiva/sclera: Conjunctivae normal.   Cardiovascular:      Rate and Rhythm: Normal rate and regular rhythm.      Heart sounds: Normal heart sounds. No murmur heard.  Pulmonary:      Effort: Pulmonary effort is normal. No respiratory distress.      Breath sounds: Normal breath sounds.   Abdominal:      General: Abdomen is flat.      Palpations: Abdomen is soft.      Tenderness: There is no abdominal tenderness.   Musculoskeletal:         General: No swelling.      Cervical back: Neck supple.      Right lower leg: No edema.      Left lower leg: No edema.   Skin:     General: Skin is warm and dry.      Capillary Refill: Capillary refill takes less than 2 seconds.   Neurological:      General: No focal deficit present.      Mental Status: He is alert and oriented to person, place, and time. Mental status is at baseline.   Psychiatric:         Mood and Affect: Mood normal.          Additional Data:     Labs:  Results from last 7 days   Lab Units 08/31/24  0806 08/30/24  0443   WBC Thousand/uL 8.77 9.77   HEMOGLOBIN g/dL 14.4 14.5   HEMATOCRIT % 43.0 43.4   PLATELETS Thousands/uL 357 353   SEGS PCT %  --  52   LYMPHO PCT %  --  30   MONO PCT %  --  12   EOS PCT %  --  5     Results from last 7 days   Lab Units 08/31/24  0806 08/30/24  0443   SODIUM mmol/L 137 136   POTASSIUM mmol/L 3.9 3.8   CHLORIDE mmol/L 105 101   CO2 mmol/L 26  30   BUN mg/dL 14 14   CREATININE mg/dL 1.00 1.08   ANION GAP mmol/L 6 5   CALCIUM mg/dL 8.3* 8.7   ALBUMIN g/dL  --  3.8   TOTAL BILIRUBIN mg/dL  --  1.70*   ALK PHOS U/L  --  52   ALT U/L  --  9   AST U/L  --  17   GLUCOSE RANDOM mg/dL 114 92     Results from last 7 days   Lab Units 08/30/24  0443   INR  1.02         Results from last 7 days   Lab Units 08/30/24  0443   HEMOGLOBIN A1C % 5.9*           Lines/Drains:  Invasive Devices       Peripheral Intravenous Line  Duration             Peripheral IV 08/29/24 Right Antecubital 2 days    Peripheral IV 08/30/24 Dorsal (posterior);Right Forearm 2 days                      Telemetry:  Telemetry Orders (From admission, onward)               24 Hour Telemetry Monitoring  Continuous x 24 Hours (Telem)        Question:  Reason for 24 Hour Telemetry  Answer:  TIA/Suspected CVA/ Confirmed CVA                     Telemetry Reviewed: Normal Sinus Rhythm  Indication for Continued Telemetry Use: No indication for continued use. Will discontinue.              Imaging: Reviewed radiology reports from this admission including: chest xray    Recent Cultures (last 7 days):         Last 24 Hours Medication List:   Current Facility-Administered Medications   Medication Dose Route Frequency Provider Last Rate    aspirin  81 mg Oral Daily Jesse Canseco DO      atorvastatin  40 mg Oral QPM Jesse Canseco DO      vitamin B-12  1,000 mcg Oral Daily Adam Severino DO      cyanocobalamin  1,000 mcg Intramuscular Weekly Adam Severino DO      OLANZapine  5 mg Intramuscular Q6H PRN Jesse Canseco DO          Today, Patient Was Seen By: Miki Addison MD    **Please Note: This note may have been constructed using a voice recognition system.**

## 2024-09-01 NOTE — PLAN OF CARE
Problem: PAIN - ADULT  Goal: Verbalizes/displays adequate comfort level or baseline comfort level  Description: Interventions:  - Encourage patient to monitor pain and request assistance  - Assess pain using appropriate pain scale  - Administer analgesics based on type and severity of pain and evaluate response  - Implement non-pharmacological measures as appropriate and evaluate response  - Notify physician/advanced practitioner if interventions unsuccessful or patient reports new pain  Outcome: Progressing     Problem: INFECTION - ADULT  Goal: Absence or prevention of progression during hospitalization  Description: INTERVENTIONS:  - Assess and monitor for signs and symptoms of infection  - Monitor lab/diagnostic results  - Monitor all insertion sites, i.e. indwelling lines, tubes, and drains  - [place] appropriate cooling/warming therapies per order  - Administer medications as ordered  - Instruct and encourage patient and family to use good hand hygiene technique  - Identify and instruct in appropriate isolation precautions for identified infection/condition  Outcome: Progressing     Problem: SAFETY ADULT  Goal: Patient will remain free of falls  Description: INTERVENTIONS:  - Educate patient/family on patient safety including physical limitations  - Instruct patient to call for assistance with activity   - Consult OT/PT to assist with strengthening/mobility   - Keep Call bell within reach  - Keep bed low and locked with side rails adjusted as appropriate  - Keep care items and personal belongings within reach  - Initiate and maintain comfort rounds  - Make Fall Risk Sign visible to staff  - Offer Toileting every 2 Hours, in advance of need  - Initiate/Maintain bed/chair alarm  - Obtain necessary fall risk management equipment.  - Apply yellow socks and bracelet for high fall risk patients  - Consider moving patient to room near nurses station  Outcome: Progressing  Goal: Maintain or return to baseline ADL  function  Description: INTERVENTIONS:  -  Assess patient's ability to carry out ADLs; assess patient's baseline for ADL function and identify physical deficits which impact ability to perform ADLs (bathing, care of mouth/teeth, toileting, grooming, dressing, etc.)  - Assess/evaluate cause of self-care deficits   - Assess range of motion  - Assess patient's mobility; develop plan if impaired  - Assess patient's need for assistive devices and provide as appropriate  - Encourage maximum independence but intervene and supervise when necessary  - Involve family in performance of ADLs  - Assess for home care needs following discharge   - Consider OT consult to assist with ADL evaluation and planning for discharge  - Provide patient education as appropriate  Outcome: Progressing  Goal: Maintains/Returns to pre admission functional level  Description: INTERVENTIONS:  - Perform AM-PAC 6 Click Basic Mobility/ Daily Activity assessment daily.  - Set and communicate daily mobility goal to care team and patient/family/caregiver.   - Collaborate with rehabilitation services on mobility goals if consulted  - Dangle patient 3 times a day  - Stand patient 3 times a day  - Ambulate patient 3 times a day  - Out of bed to chair 3 times a day   - Out of bed for meals 3 times a day  - Out of bed for toileting  - Record patient progress and toleration of activity level   Outcome: Progressing     Problem: DISCHARGE PLANNING  Goal: Discharge to home or other facility with appropriate resources  Description: INTERVENTIONS:  - Identify barriers to discharge w/patient and caregiver  - Arrange for needed discharge resources and transportation as appropriate  - Identify discharge learning needs (meds, wound care, etc.)  - Refer to Case Management Department for coordinating discharge planning if the patient needs post-hospital services based on physician/advanced practitioner order or complex needs related to functional status, cognitive  ability, or social support system  Outcome: Progressing     Problem: Knowledge Deficit  Goal: Patient/family/caregiver demonstrates understanding of disease process, treatment plan, medications, and discharge instructions  Description: Complete learning assessment and assess knowledge base.  Interventions:  - Provide teaching at level of understanding  - Provide teaching via preferred learning methods  Outcome: Progressing     Problem: NEUROSENSORY - ADULT  Goal: Achieves stable or improved neurological status  Description: INTERVENTIONS  - Monitor and report changes in neurological status  - Monitor vital signs such as temperature, blood pressure, glucose, and any other labs ordered   - Initiate measures to prevent increased intracranial pressure  - Monitor for seizure activity and implement precautions if appropriate      Outcome: Progressing  Goal: Achieves maximal functionality and self care  Description: INTERVENTIONS  - Monitor swallowing and airway patency with patient fatigue and changes in neurological status  - Encourage and assist patient to increase activity and self care.   - Encourage visually impaired, hearing impaired and aphasic patients to use assistive/communication devices  Outcome: Progressing     Problem: CARDIOVASCULAR - ADULT  Goal: Maintains optimal cardiac output and hemodynamic stability  Description: INTERVENTIONS:  - Monitor I/O, vital signs and rhythm  - Monitor for S/S and trends of decreased cardiac output  - Administer and titrate ordered vasoactive medications to optimize hemodynamic stability  - Assess quality of pulses, skin color and temperature  - Assess for signs of decreased coronary artery perfusion  - Instruct patient to report change in severity of symptoms  Outcome: Progressing  Goal: Absence of cardiac dysrhythmias or at baseline rhythm  Description: INTERVENTIONS:  - Continuous cardiac monitoring, vital signs, obtain 12 lead EKG if ordered  - Administer antiarrhythmic  and heart rate control medications as ordered  - Monitor electrolytes and administer replacement therapy as ordered  Outcome: Progressing     Problem: Prexisting or High Potential for Compromised Skin Integrity  Goal: Skin integrity is maintained or improved  Description: INTERVENTIONS:  - Identify patients at risk for skin breakdown  - Assess and monitor skin integrity  - Assess and monitor nutrition and hydration status  - Monitor labs   - Assess for incontinence   - Turn and reposition patient  - Assist with mobility/ambulation  - Relieve pressure over bony prominences  - Avoid friction and shearing  - Provide appropriate hygiene as needed including keeping skin clean and dry  - Evaluate need for skin moisturizer/barrier cream  - Collaborate with interdisciplinary team   - Patient/family teaching  - Consider wound care consult   Outcome: Progressing     Problem: Neurological Deficit  Goal: Neurological status is stable or improving  Description: Interventions:  - Monitor and assess patient's level of consciousness, motor function, sensory function, and level of assistance needed for ADLs.   - Monitor and report changes from baseline. Collaborate with interdisciplinary team to initiate plan and implement interventions as ordered.   - Provide and maintain a safe environment.  - Consider seizure precautions.  - Consider fall precautions.  - Consider aspiration precautions.  - Consider bleeding precautions.  Outcome: Progressing

## 2024-09-01 NOTE — ASSESSMENT & PLAN NOTE
Pt presents from home for evaluation of AMS starting at 1100 8/29.  No focal neurological deficits.   Baseline:patient able todo all chores around the house yesterday. Able to drive, cook, clean himself, pick out clothes  CTA head/ neck with no evidence of intercranial hemorrhage, Severe chronic microvascular ischemic change and superimposed chronic infarcts as detailed above. Age-indeterminate right temporal infarct, likely chronic.   EKG sinus without acute ischemic changes  CXR on my review without large infiltrate or effusion awaiting official read  MRI 8/31: Acute infarct involving the right temporal lobe with petechial hemorrhage.  -Small area of acute infarct within the left parietal periventricular white matter.  Patient still oriented only to self. Incorrectly named president. Doesn't eremember why he is in hospital.   No clot on echo, tele was normal sinus no events  Monitor on telemetry  Placed on ASA and high intensity statin  loop recorder/zio as op  Fall and delirium precautions  Need placement for PT.  Pending Placement  Medically clear for discharge

## 2024-09-01 NOTE — ASSESSMENT & PLAN NOTE
"Wife reports a history of a \"Watershed\" CVA in 2015 and patient has had short term memory deficits since then   Patient is reportedly on aspirin as well as \"simvastatin\" but wife unsure of medication doses  Aspiring, High Intensity statin   Obtain records form [facility] where patient follows with Dr. Peters (neurology)  "

## 2024-09-02 LAB
ANION GAP SERPL CALCULATED.3IONS-SCNC: 9 MMOL/L (ref 4–13)
BUN SERPL-MCNC: 14 MG/DL (ref 5–25)
CALCIUM SERPL-MCNC: 8.6 MG/DL (ref 8.4–10.2)
CHLORIDE SERPL-SCNC: 101 MMOL/L (ref 96–108)
CO2 SERPL-SCNC: 27 MMOL/L (ref 21–32)
CREAT SERPL-MCNC: 0.97 MG/DL (ref 0.6–1.3)
ERYTHROCYTE [DISTWIDTH] IN BLOOD BY AUTOMATED COUNT: 14.6 % (ref 11.6–15.1)
GFR SERPL CREATININE-BSD FRML MDRD: 74 ML/MIN/1.73SQ M
GLUCOSE SERPL-MCNC: 89 MG/DL (ref 65–140)
HCT VFR BLD AUTO: 41.2 % (ref 36.5–49.3)
HGB BLD-MCNC: 13.8 G/DL (ref 12–17)
MCH RBC QN AUTO: 31 PG (ref 26.8–34.3)
MCHC RBC AUTO-ENTMCNC: 33.5 G/DL (ref 31.4–37.4)
MCV RBC AUTO: 93 FL (ref 82–98)
PLATELET # BLD AUTO: 358 THOUSANDS/UL (ref 149–390)
PMV BLD AUTO: 9.7 FL (ref 8.9–12.7)
POTASSIUM SERPL-SCNC: 4 MMOL/L (ref 3.5–5.3)
RBC # BLD AUTO: 4.45 MILLION/UL (ref 3.88–5.62)
SODIUM SERPL-SCNC: 137 MMOL/L (ref 135–147)
WBC # BLD AUTO: 9.58 THOUSAND/UL (ref 4.31–10.16)

## 2024-09-02 PROCEDURE — 80048 BASIC METABOLIC PNL TOTAL CA: CPT

## 2024-09-02 PROCEDURE — 85027 COMPLETE CBC AUTOMATED: CPT

## 2024-09-02 PROCEDURE — 99232 SBSQ HOSP IP/OBS MODERATE 35: CPT

## 2024-09-02 RX ADMIN — ASPIRIN 81 MG CHEWABLE TABLET 81 MG: 81 TABLET CHEWABLE at 07:11

## 2024-09-02 RX ADMIN — CYANOCOBALAMIN TAB 500 MCG 1000 MCG: 500 TAB at 07:11

## 2024-09-02 RX ADMIN — ATORVASTATIN CALCIUM 40 MG: 40 TABLET, FILM COATED ORAL at 16:47

## 2024-09-02 NOTE — ASSESSMENT & PLAN NOTE
Follows with [facility] and Neurologist Dr. Peters  Step down progression with most recent stroke. Worse short term memory per family  Spouse reports that over the past several months patient has been more forgetful with regards to medications and not able   Possible vascular dementia

## 2024-09-02 NOTE — PLAN OF CARE
Problem: PAIN - ADULT  Goal: Verbalizes/displays adequate comfort level or baseline comfort level  Description: Interventions:  - Encourage patient to monitor pain and request assistance  - Assess pain using appropriate pain scale  - Administer analgesics based on type and severity of pain and evaluate response  - Implement non-pharmacological measures as appropriate and evaluate response  - Notify physician/advanced practitioner if interventions unsuccessful or patient reports new pain  Outcome: Progressing     Problem: INFECTION - ADULT  Goal: Absence or prevention of progression during hospitalization  Description: INTERVENTIONS:  - Assess and monitor for signs and symptoms of infection  - Monitor lab/diagnostic results  - Monitor all insertion sites, i.e. indwelling lines, tubes, and drains  - [place] appropriate cooling/warming therapies per order  - Administer medications as ordered  - Instruct and encourage patient and family to use good hand hygiene technique  - Identify and instruct in appropriate isolation precautions for identified infection/condition  Outcome: Progressing     Problem: SAFETY ADULT  Goal: Patient will remain free of falls  Description: INTERVENTIONS:  - Educate patient/family on patient safety including physical limitations  - Instruct patient to call for assistance with activity   - Consult OT/PT to assist with strengthening/mobility   - Keep Call bell within reach  - Keep bed low and locked with side rails adjusted as appropriate  - Keep care items and personal belongings within reach  - Initiate and maintain comfort rounds  - Make Fall Risk Sign visible to staff  - Offer Toileting every 2 Hours, in advance of need  - Initiate/Maintain bed/chair alarm  - Obtain necessary fall risk management equipment.  - Apply yellow socks and bracelet for high fall risk patients  - Consider moving patient to room near nurses station  Outcome: Progressing  Goal: Maintain or return to baseline ADL  function  Description: INTERVENTIONS:  -  Assess patient's ability to carry out ADLs; assess patient's baseline for ADL function and identify physical deficits which impact ability to perform ADLs (bathing, care of mouth/teeth, toileting, grooming, dressing, etc.)  - Assess/evaluate cause of self-care deficits   - Assess range of motion  - Assess patient's mobility; develop plan if impaired  - Assess patient's need for assistive devices and provide as appropriate  - Encourage maximum independence but intervene and supervise when necessary  - Involve family in performance of ADLs  - Assess for home care needs following discharge   - Consider OT consult to assist with ADL evaluation and planning for discharge  - Provide patient education as appropriate  Outcome: Progressing  Goal: Maintains/Returns to pre admission functional level  Description: INTERVENTIONS:  - Perform AM-PAC 6 Click Basic Mobility/ Daily Activity assessment daily.  - Set and communicate daily mobility goal to care team and patient/family/caregiver.   - Collaborate with rehabilitation services on mobility goals if consulted  - Dangle patient 3 times a day  - Stand patient 3 times a day  - Ambulate patient 3 times a day  - Out of bed to chair 3 times a day   - Out of bed for meals 3 times a day  - Out of bed for toileting  - Record patient progress and toleration of activity level   Outcome: Progressing     Problem: DISCHARGE PLANNING  Goal: Discharge to home or other facility with appropriate resources  Description: INTERVENTIONS:  - Identify barriers to discharge w/patient and caregiver  - Arrange for needed discharge resources and transportation as appropriate  - Identify discharge learning needs (meds, wound care, etc.)  - Refer to Case Management Department for coordinating discharge planning if the patient needs post-hospital services based on physician/advanced practitioner order or complex needs related to functional status, cognitive  ability, or social support system  Outcome: Progressing     Problem: Knowledge Deficit  Goal: Patient/family/caregiver demonstrates understanding of disease process, treatment plan, medications, and discharge instructions  Description: Complete learning assessment and assess knowledge base.  Interventions:  - Provide teaching at level of understanding  - Provide teaching via preferred learning methods  Outcome: Progressing     Problem: NEUROSENSORY - ADULT  Goal: Achieves stable or improved neurological status  Description: INTERVENTIONS  - Monitor and report changes in neurological status  - Monitor vital signs such as temperature, blood pressure, glucose, and any other labs ordered   - Initiate measures to prevent increased intracranial pressure  - Monitor for seizure activity and implement precautions if appropriate      Outcome: Progressing  Goal: Achieves maximal functionality and self care  Description: INTERVENTIONS  - Monitor swallowing and airway patency with patient fatigue and changes in neurological status  - Encourage and assist patient to increase activity and self care.   - Encourage visually impaired, hearing impaired and aphasic patients to use assistive/communication devices  Outcome: Progressing     Problem: CARDIOVASCULAR - ADULT  Goal: Maintains optimal cardiac output and hemodynamic stability  Description: INTERVENTIONS:  - Monitor I/O, vital signs and rhythm  - Monitor for S/S and trends of decreased cardiac output  - Administer and titrate ordered vasoactive medications to optimize hemodynamic stability  - Assess quality of pulses, skin color and temperature  - Assess for signs of decreased coronary artery perfusion  - Instruct patient to report change in severity of symptoms  Outcome: Progressing  Goal: Absence of cardiac dysrhythmias or at baseline rhythm  Description: INTERVENTIONS:  - Continuous cardiac monitoring, vital signs, obtain 12 lead EKG if ordered  - Administer antiarrhythmic  and heart rate control medications as ordered  - Monitor electrolytes and administer replacement therapy as ordered  Outcome: Progressing     Problem: Prexisting or High Potential for Compromised Skin Integrity  Goal: Skin integrity is maintained or improved  Description: INTERVENTIONS:  - Identify patients at risk for skin breakdown  - Assess and monitor skin integrity  - Assess and monitor nutrition and hydration status  - Monitor labs   - Assess for incontinence   - Turn and reposition patient  - Assist with mobility/ambulation  - Relieve pressure over bony prominences  - Avoid friction and shearing  - Provide appropriate hygiene as needed including keeping skin clean and dry  - Evaluate need for skin moisturizer/barrier cream  - Collaborate with interdisciplinary team   - Patient/family teaching  - Consider wound care consult   Outcome: Progressing     Problem: Neurological Deficit  Goal: Neurological status is stable or improving  Description: Interventions:  - Monitor and assess patient's level of consciousness, motor function, sensory function, and level of assistance needed for ADLs.   - Monitor and report changes from baseline. Collaborate with interdisciplinary team to initiate plan and implement interventions as ordered.   - Provide and maintain a safe environment.  - Consider seizure precautions.  - Consider fall precautions.  - Consider aspiration precautions.  - Consider bleeding precautions.  Outcome: Progressing

## 2024-09-02 NOTE — PROGRESS NOTES
[facility]  Progress Note  Name: Edy Putnam I  MRN: [id number]  Unit/Bed#: [facility] 705-01 I Date of Admission: 8/29/2024   Date of Service: 9/2/2024 I Hospital Day: 3    Assessment & Plan   * Acute stroke due to ischemia (HCC)  Assessment & Plan  Pt presents from home for evaluation of AMS starting at 1100 8/29.  No focal neurological deficits.   Baseline:patient able todo all chores around the house yesterday. Able to drive, cook, clean himself, pick out clothes  CTA head/ neck with no evidence of intercranial hemorrhage, Severe chronic microvascular ischemic change and superimposed chronic infarcts as detailed above. Age-indeterminate right temporal infarct, likely chronic.   EKG sinus without acute ischemic changes  CXR on my review without large infiltrate or effusion awaiting official read  MRI 8/31: Acute infarct involving the right temporal lobe with petechial hemorrhage.  -Small area of acute infarct within the left parietal periventricular white matter.  Patient still oriented only to self. Incorrectly named president. Doesn't eremember why he is in hospital.   No clot on echo, tele was normal sinus no events  Monitor on telemetry  Placed on ASA and high intensity statin  loop recorder/zio as op  Fall and delirium precautions  Need placement for PT.  Pending Placement  Medically clear for discharge    S/P splenectomy  Assessment & Plan  Prev splenectomy    Pneumococcal pneumonia (HCC)  Assessment & Plan  H/o Pneumococcal Pneumonia in the late 1980's   Patient had a splenectomy in the past    Dementia (HCC)  Assessment & Plan  Follows with [facility] and Neurologist Dr. Peters  Possible vascular dementia  Step down progression with most recent stroke. Worse short term memory per family  Spouse reports that over the past several months patient has been more forgetful with regards to medications and not able     History of stroke  Assessment & Plan  Wife reports a  "history of a \"[place]\" CVA in 2015 and patient has had short term memory deficits since then   Patient is reportedly on aspirin as well as \"simvastatin\" but wife unsure of medication doses  Aspiring, High Intensity statin   Obtain records form [facility] where patient follows with Dr. Peters (neurology)               VTE Pharmacologic Prophylaxis: VTE Score: 9 High Risk (Score >/= 5) - Pharmacological DVT Prophylaxis Ordered: enoxaparin (Lovenox). Sequential Compression Devices Ordered.    Mobility:   Basic Mobility Inpatient Raw Score: 20  JH-HLM Goal: 6: Walk 10 steps or more  JH-HLM Achieved: 6: Walk 10 steps or more  JH-HLM Goal achieved. Continue to encourage appropriate mobility.    Patient Centered Rounds: I performed bedside rounds with nursing staff today.   Discussions with Specialists or Other Care Team Provider: None    Education and Discussions with Family / Patient: Attempted to update  (wife) via phone. Unable to contact.    Total Time Spent on Date of Encounter in care of patient: 40 mins. This time was spent on one or more of the following: performing physical exam; counseling and coordination of care; obtaining or reviewing history; documenting in the medical record; reviewing/ordering tests, medications or procedures; communicating with other healthcare professionals and discussing with patient's family/caregivers.    Current Length of Stay: 3 day(s)  Current Patient Status: Inpatient   Certification Statement: The patient will continue to require additional inpatient hospital stay due to Placement  Discharge Plan: Anticipate discharge in 24-48 hrs to home. Pending rehab placement    Code Status: Level 3 - DNAR and DNI    Subjective:   Patient still confused.  Only oriented to self.  No complaints  Objective:     Vitals:   Temp (24hrs), Av.1 °F (36.7 °C), Min:97.9 °F (36.6 °C), Max:98.2 °F (36.8 °C)    Temp:  [97.9 °F (36.6 °C)-98.2 °F (36.8 °C)] 98.2 °F (36.8 " °C)  HR:  [61-66] 66  Resp:  [17-18] 18  BP: (107-125)/(52-70) 123/70  SpO2:  [94 %-97 %] 96 %  Body mass index is 24.53 kg/m².     Input and Output Summary (last 24 hours):     Intake/Output Summary (Last 24 hours) at 9/2/2024 1411  Last data filed at 9/2/2024 1100  Gross per 24 hour   Intake 480 ml   Output --   Net 480 ml       Physical Exam:   Physical Exam  Vitals and nursing note reviewed.   Constitutional:       Appearance: He is well-developed and normal weight.   HENT:      Head: Normocephalic and atraumatic.      Nose: Nose normal.      Mouth/Throat:      Mouth: Mucous membranes are moist.   Eyes:      Conjunctiva/sclera: Conjunctivae normal.   Cardiovascular:      Rate and Rhythm: Normal rate and regular rhythm.      Heart sounds: Normal heart sounds. No murmur heard.  Pulmonary:      Effort: Pulmonary effort is normal. No respiratory distress.      Breath sounds: Normal breath sounds.   Abdominal:      General: Abdomen is flat.      Palpations: Abdomen is soft.      Tenderness: There is no abdominal tenderness.   Musculoskeletal:         General: No swelling.      Cervical back: Neck supple.      Right lower leg: No edema.      Left lower leg: No edema.   Skin:     General: Skin is warm and dry.      Capillary Refill: Capillary refill takes less than 2 seconds.   Neurological:      General: No focal deficit present.      Mental Status: He is alert. Mental status is at baseline. He is disoriented.   Psychiatric:         Mood and Affect: Mood normal.          Additional Data:     Labs:  Results from last 7 days   Lab Units 09/02/24  0605 08/31/24  0806 08/30/24  0443   WBC Thousand/uL 9.58   < > 9.77   HEMOGLOBIN g/dL 13.8   < > 14.5   HEMATOCRIT % 41.2   < > 43.4   PLATELETS Thousands/uL 358   < > 353   SEGS PCT %  --   --  52   LYMPHO PCT %  --   --  30   MONO PCT %  --   --  12   EOS PCT %  --   --  5    < > = values in this interval not displayed.     Results from last 7 days   Lab Units 09/02/24  0605  08/31/24  0806 08/30/24  0443   SODIUM mmol/L 137   < > 136   POTASSIUM mmol/L 4.0   < > 3.8   CHLORIDE mmol/L 101   < > 101   CO2 mmol/L 27   < > 30   BUN mg/dL 14   < > 14   CREATININE mg/dL 0.97   < > 1.08   ANION GAP mmol/L 9   < > 5   CALCIUM mg/dL 8.6   < > 8.7   ALBUMIN g/dL  --   --  3.8   TOTAL BILIRUBIN mg/dL  --   --  1.70*   ALK PHOS U/L  --   --  52   ALT U/L  --   --  9   AST U/L  --   --  17   GLUCOSE RANDOM mg/dL 89   < > 92    < > = values in this interval not displayed.     Results from last 7 days   Lab Units 08/30/24  0443   INR  1.02         Results from last 7 days   Lab Units 08/30/24  0443   HEMOGLOBIN A1C % 5.9*           Lines/Drains:  Invasive Devices       Peripheral Intravenous Line  Duration             Peripheral IV 08/30/24 Dorsal (posterior);Right Forearm 3 days                      Telemetry:  Telemetry Orders (From admission, onward)               24 Hour Telemetry Monitoring  Continuous x 24 Hours (Telem)        Question:  Reason for 24 Hour Telemetry  Answer:  TIA/Suspected CVA/ Confirmed CVA                     Telemetry Reviewed: Normal Sinus Rhythm  Indication for Continued Telemetry Use: No indication for continued use. Will discontinue.              Imaging: Reviewed radiology reports from this admission including: chest xray    Recent Cultures (last 7 days):         Last 24 Hours Medication List:   Current Facility-Administered Medications   Medication Dose Route Frequency Provider Last Rate    aspirin  81 mg Oral Daily Jesse Canseco DO      atorvastatin  40 mg Oral QPM Jesse Canseco DO      vitamin B-12  1,000 mcg Oral Daily Adam Severino DO      cyanocobalamin  1,000 mcg Intramuscular Weekly Adam Severino DO      OLANZapine  5 mg Intramuscular Q6H PRN Jesse Canseco DO          Today, Patient Was Seen By: Miki Addison MD    **Please Note: This note may have been constructed using a voice recognition system.**

## 2024-09-02 NOTE — ASSESSMENT & PLAN NOTE
Pt presents from home for evaluation of AMS starting on  8/29.  No focal neurological deficits.   Baseline:patient able to do all chores around the house . Able to drive, cook, clean himself, pick out clothes  CTA head/ neck with no evidence of intercranial hemorrhage, Severe chronic microvascular ischemic change and superimposed chronic infarcts as detailed above. Age-indeterminate right temporal infarct, likely chronic.   EKG sinus without acute ischemic changes  CXR normal  MRI 8/31: Acute infarct involving the right temporal lobe with petechial hemorrhage.  -Small area of acute infarct within the left parietal periventricular white matter.  No clot on echo, tele was normal sinus no events  Placed on ASA and high intensity statin  loop recorder/zio as op  Fall and delirium precautions  Need placement for PT.  Pending Placement  Medically clear for discharge

## 2024-09-02 NOTE — CASE MANAGEMENT
Case Management Discharge Planning Note    Patient name Edy Putnam  Location [facility] 705/[facility] 705-01 MRN [id number]  : 1945 Date 2024       Current Admission Date: 2024  Current Admission Diagnosis:Acute stroke due to ischemia (HCC)   Patient Active Problem List    Diagnosis Date Noted Date Diagnosed    B12 deficiency 2024     Pneumococcal pneumonia (HCC) 2024     S/P splenectomy 2024     Acute stroke due to ischemia (HCC) 2024     History of stroke 2024     Dementia (HCC) 2024       LOS (days): 3  Geometric Mean LOS (GMLOS) (days):   Days to GMLOS:     OBJECTIVE:  Risk of Unplanned Readmission Score: 8.79         Current admission status: Inpatient   Preferred Pharmacy:   PATIENT/FAMILY REPORTS NO PREFERRED PHARMACY  No address on file      Primary Care Provider: No primary care provider on file.    Primary Insurance: [facility]  Secondary Insurance: MEDICARE    DISCHARGE DETAILS:    Discharge planning discussed with:: Tylor  Freedom of Choice: Yes  Comments - Freedom of Choice: Requesting additional referrals to facilities in [place]  CM contacted family/caregiver?: Yes             Contacts  Patient Contacts: Tylor Putnam/Spouse  Relationship to Patient:: Family  Contact Method: Phone  Phone Number: [phone]  Reason/Outcome: Continuity of Care, Emergency Contact, Discharge Planning, Referral       Other Referral/Resources/Interventions Provided:  Interventions: Short Term Rehab  Referral Comments: KIMMIE called Tylor to review choice list.  Tylor requested additional referrals be sent to facilities in [place].  CM sent additional referrals.         Treatment Team Recommendation: Short Term Rehab  Discharge Destination Plan:: Short Term Rehab         Additional Comments: KIMMIE called Tylor to review choice list.  Tylor requested additional referrals be sent to facilities in [place].  KIMMIE sent additional referrals. Tylor requested that  choice list be emailed to [email or web address] tomorrow when all choices are available.

## 2024-09-02 NOTE — ASSESSMENT & PLAN NOTE
"Wife reports a history of a \"[place]\" CVA in 2015 and patient has had short term memory deficits since then   Patient is reportedly on aspirin as well as \"simvastatin\" but wife unsure of medication doses  Aspirin, High Intensity statin   Records form [facility] where patient follows with Dr. Peters (neurology)  "

## 2024-09-03 PROCEDURE — 99232 SBSQ HOSP IP/OBS MODERATE 35: CPT | Performed by: INTERNAL MEDICINE

## 2024-09-03 PROCEDURE — 97112 NEUROMUSCULAR REEDUCATION: CPT

## 2024-09-03 PROCEDURE — 97116 GAIT TRAINING THERAPY: CPT

## 2024-09-03 PROCEDURE — 97530 THERAPEUTIC ACTIVITIES: CPT

## 2024-09-03 RX ORDER — ENOXAPARIN SODIUM 100 MG/ML
40 INJECTION SUBCUTANEOUS
Status: DISCONTINUED | OUTPATIENT
Start: 2024-09-03 | End: 2024-09-11 | Stop reason: HOSPADM

## 2024-09-03 RX ADMIN — ATORVASTATIN CALCIUM 40 MG: 40 TABLET, FILM COATED ORAL at 17:15

## 2024-09-03 RX ADMIN — ENOXAPARIN SODIUM 40 MG: 40 INJECTION SUBCUTANEOUS at 14:34

## 2024-09-03 RX ADMIN — ASPIRIN 81 MG CHEWABLE TABLET 81 MG: 81 TABLET CHEWABLE at 08:58

## 2024-09-03 RX ADMIN — CYANOCOBALAMIN TAB 500 MCG 1000 MCG: 500 TAB at 08:58

## 2024-09-03 NOTE — PLAN OF CARE
Problem: SAFETY ADULT  Goal: Patient will remain free of falls  Description: INTERVENTIONS:  - Educate patient/family on patient safety including physical limitations  - Instruct patient to call for assistance with activity   - Consult OT/PT to assist with strengthening/mobility   - Keep Call bell within reach  - Keep bed low and locked with side rails adjusted as appropriate  - Keep care items and personal belongings within reach  - Initiate and maintain comfort rounds  - Make Fall Risk Sign visible to staff  - Offer Toileting every 2 Hours, in advance of need  - Initiate/Maintain bed/chair alarm  - Obtain necessary fall risk management equipment.  - Apply yellow socks and bracelet for high fall risk patients  - Consider moving patient to room near nurses station  Outcome: Progressing  Goal: Maintain or return to baseline ADL function  Description: INTERVENTIONS:  -  Assess patient's ability to carry out ADLs; assess patient's baseline for ADL function and identify physical deficits which impact ability to perform ADLs (bathing, care of mouth/teeth, toileting, grooming, dressing, etc.)  - Assess/evaluate cause of self-care deficits   - Assess range of motion  - Assess patient's mobility; develop plan if impaired  - Assess patient's need for assistive devices and provide as appropriate  - Encourage maximum independence but intervene and supervise when necessary  - Involve family in performance of ADLs  - Assess for home care needs following discharge   - Consider OT consult to assist with ADL evaluation and planning for discharge  - Provide patient education as appropriate  Outcome: Progressing  Goal: Maintains/Returns to pre admission functional level  Description: INTERVENTIONS:  - Perform AM-PAC 6 Click Basic Mobility/ Daily Activity assessment daily.  - Set and communicate daily mobility goal to care team and patient/family/caregiver.   - Collaborate with rehabilitation services on mobility goals if  consulted  - Dangle patient 3 times a day  - Stand patient 3 times a day  - Ambulate patient 3 times a day  - Out of bed to chair 3 times a day   - Out of bed for meals 3 times a day  - Out of bed for toileting  - Record patient progress and toleration of activity level   Outcome: Progressing     Problem: DISCHARGE PLANNING  Goal: Discharge to home or other facility with appropriate resources  Description: INTERVENTIONS:  - Identify barriers to discharge w/patient and caregiver  - Arrange for needed discharge resources and transportation as appropriate  - Identify discharge learning needs (meds, wound care, etc.)  - Refer to Case Management Department for coordinating discharge planning if the patient needs post-hospital services based on physician/advanced practitioner order or complex needs related to functional status, cognitive ability, or social support system  Outcome: Progressing     Problem: Knowledge Deficit  Goal: Patient/family/caregiver demonstrates understanding of disease process, treatment plan, medications, and discharge instructions  Description: Complete learning assessment and assess knowledge base.  Interventions:  - Provide teaching at level of understanding  - Provide teaching via preferred learning methods  Outcome: Progressing     Problem: NEUROSENSORY - ADULT  Goal: Achieves stable or improved neurological status  Description: INTERVENTIONS  - Monitor and report changes in neurological status  - Monitor vital signs such as temperature, blood pressure, glucose, and any other labs ordered   - Initiate measures to prevent increased intracranial pressure  - Monitor for seizure activity and implement precautions if appropriate      Outcome: Progressing  Goal: Achieves maximal functionality and self care  Description: INTERVENTIONS  - Monitor swallowing and airway patency with patient fatigue and changes in neurological status  - Encourage and assist patient to increase activity and self care.   -  Encourage visually impaired, hearing impaired and aphasic patients to use assistive/communication devices  Outcome: Progressing     Problem: CARDIOVASCULAR - ADULT  Goal: Maintains optimal cardiac output and hemodynamic stability  Description: INTERVENTIONS:  - Monitor I/O, vital signs and rhythm  - Monitor for S/S and trends of decreased cardiac output  - Administer and titrate ordered vasoactive medications to optimize hemodynamic stability  - Assess quality of pulses, skin color and temperature  - Assess for signs of decreased coronary artery perfusion  - Instruct patient to report change in severity of symptoms  Outcome: Progressing  Goal: Absence of cardiac dysrhythmias or at baseline rhythm  Description: INTERVENTIONS:  - Continuous cardiac monitoring, vital signs, obtain 12 lead EKG if ordered  - Administer antiarrhythmic and heart rate control medications as ordered  - Monitor electrolytes and administer replacement therapy as ordered  Outcome: Progressing     Problem: Neurological Deficit  Goal: Neurological status is stable or improving  Description: Interventions:  - Monitor and assess patient's level of consciousness, motor function, sensory function, and level of assistance needed for ADLs.   - Monitor and report changes from baseline. Collaborate with interdisciplinary team to initiate plan and implement interventions as ordered.   - Provide and maintain a safe environment.  - Consider seizure precautions.  - Consider fall precautions.  - Consider aspiration precautions.  - Consider bleeding precautions.  Outcome: Progressing

## 2024-09-03 NOTE — PLAN OF CARE
Problem: SAFETY ADULT  Goal: Patient will remain free of falls  Description: INTERVENTIONS:  - Educate patient/family on patient safety including physical limitations  - Instruct patient to call for assistance with activity   - Consult OT/PT to assist with strengthening/mobility   - Keep Call bell within reach  - Keep bed low and locked with side rails adjusted as appropriate  - Keep care items and personal belongings within reach  - Initiate and maintain comfort rounds  - Make Fall Risk Sign visible to staff  - Offer Toileting every 2 Hours, in advance of need  - Initiate/Maintain bed/chair alarm  - Obtain necessary fall risk management equipment.  - Apply yellow socks and bracelet for high fall risk patients  - Consider moving patient to room near nurses station  Outcome: Progressing

## 2024-09-03 NOTE — PLAN OF CARE
Problem: DISCHARGE PLANNING  Goal: Discharge to home or other facility with appropriate resources  Description: INTERVENTIONS:  - Identify barriers to discharge w/patient and caregiver  - Arrange for needed discharge resources and transportation as appropriate  - Identify discharge learning needs (meds, wound care, etc.)  - Refer to Case Management Department for coordinating discharge planning if the patient needs post-hospital services based on physician/advanced practitioner order or complex needs related to functional status, cognitive ability, or social support system  9/3/2024 1941 by Cici Colunga RN  Outcome: Progressing  9/3/2024 1229 by Cici Colunga RN  Outcome: Progressing     Problem: Knowledge Deficit  Goal: Patient/family/caregiver demonstrates understanding of disease process, treatment plan, medications, and discharge instructions  Description: Complete learning assessment and assess knowledge base.  Interventions:  - Provide teaching at level of understanding  - Provide teaching via preferred learning methods  9/3/2024 1941 by Cici Colunga RN  Outcome: Progressing  9/3/2024 1229 by Cici Colunga RN  Outcome: Progressing     Problem: NEUROSENSORY - ADULT  Goal: Achieves stable or improved neurological status  Description: INTERVENTIONS  - Monitor and report changes in neurological status  - Monitor vital signs such as temperature, blood pressure, glucose, and any other labs ordered   - Initiate measures to prevent increased intracranial pressure  - Monitor for seizure activity and implement precautions if appropriate      9/3/2024 1941 by Cici Colunga RN  Outcome: Progressing  9/3/2024 1229 by Cici Colunga RN  Outcome: Progressing  Goal: Achieves maximal functionality and self care  Description: INTERVENTIONS  - Monitor swallowing and airway patency with patient fatigue and changes in neurological status  - Encourage and assist patient to increase activity  and self care.   - Encourage visually impaired, hearing impaired and aphasic patients to use assistive/communication devices  9/3/2024 1941 by Cici Colunga RN  Outcome: Progressing  9/3/2024 1229 by Cici Colunga RN  Outcome: Progressing     Problem: CARDIOVASCULAR - ADULT  Goal: Maintains optimal cardiac output and hemodynamic stability  Description: INTERVENTIONS:  - Monitor I/O, vital signs and rhythm  - Monitor for S/S and trends of decreased cardiac output  - Administer and titrate ordered vasoactive medications to optimize hemodynamic stability  - Assess quality of pulses, skin color and temperature  - Assess for signs of decreased coronary artery perfusion  - Instruct patient to report change in severity of symptoms  9/3/2024 1941 by Cici Colunga RN  Outcome: Progressing  9/3/2024 1229 by Cici Colunga RN  Outcome: Progressing  Goal: Absence of cardiac dysrhythmias or at baseline rhythm  Description: INTERVENTIONS:  - Continuous cardiac monitoring, vital signs, obtain 12 lead EKG if ordered  - Administer antiarrhythmic and heart rate control medications as ordered  - Monitor electrolytes and administer replacement therapy as ordered  9/3/2024 1941 by Cici Colunga RN  Outcome: Progressing  9/3/2024 1229 by Cici Colunga RN  Outcome: Progressing     Problem: Neurological Deficit  Goal: Neurological status is stable or improving  Description: Interventions:  - Monitor and assess patient's level of consciousness, motor function, sensory function, and level of assistance needed for ADLs.   - Monitor and report changes from baseline. Collaborate with interdisciplinary team to initiate plan and implement interventions as ordered.   - Provide and maintain a safe environment.  - Consider seizure precautions.  - Consider fall precautions.  - Consider aspiration precautions.  - Consider bleeding precautions.  9/3/2024 1941 by Cici Colunga RN  Outcome: Progressing  9/3/2024  1229 by Cici Colunga, RN  Outcome: Progressing

## 2024-09-03 NOTE — RESTORATIVE TECHNICIAN NOTE
Restorative Technician Note      Patient Name: Edy Putnam     Note Type: Mobility  Patient Position Upon Consult: Bedside chair  Activity Performed: Ambulated; Dangled; Stood  Assistive Device: Roller walker  Education Provided: Yes  Patient Position at End of Consult: Bedside chair; All needs within reach; Bed/Chair alarm activated    Amandeep MILLER, Restorative Technician,

## 2024-09-03 NOTE — CASE MANAGEMENT
Case Management Discharge Planning Note    Patient name Edy Putnam  Location [facility] MRN [id number]  : 1945 Date 9/3/2024       Current Admission Date: 2024  Current Admission Diagnosis:Acute stroke due to ischemia (HCC)   Patient Active Problem List    Diagnosis Date Noted Date Diagnosed    B12 deficiency 2024     Pneumococcal pneumonia (HCC) 2024     S/P splenectomy 2024     Acute stroke due to ischemia (HCC) 2024     History of stroke 2024     Dementia ([facility]) 2024       LOS (days): 4  Geometric Mean LOS (GMLOS) (days):   Days to GMLOS:     OBJECTIVE:  Risk of Unplanned Readmission Score: 8.88         Current admission status: Inpatient   Preferred Pharmacy:   PATIENT/FAMILY REPORTS NO PREFERRED PHARMACY  No address on file      Primary Care Provider: No primary care provider on file.    Primary Insurance: [facility]  Secondary Insurance: MEDICARE    DISCHARGE DETAILS:            Other Referral/Resources/Interventions Provided:  Referral Comments: TC to [facility] and also emailed choices to same- awaiting choice         Treatment Team Recommendation: Short Term Rehab, SNF

## 2024-09-03 NOTE — PHYSICAL THERAPY NOTE
Physical Therapy Progress Note     09/03/24 1000   PT Last Visit   PT Visit Date 09/03/24   Note Type   Note Type Treatment   Pain Assessment   Pain Assessment Tool 0-10   Pain Score No Pain   Restrictions/Precautions   Other Precautions Cognitive;Impulsive;Chair Alarm;Bed Alarm;Fall Risk  (Alarm active post session.)   Subjective   Subjective The patient states that he needs to go to the bathroom. He was agreeable to walk afterwards.   Bed Mobility   Supine to Sit 5  Supervision   Additional items Impulsive   Transfers   Sit to Stand 5  Supervision   Additional items Increased time required;Verbal cues   Stand to Sit 5  Supervision   Additional items Verbal cues;Increased time required   Ambulation/Elevation   Gait pattern Excessively slow;Short stride;Inconsistent cassidy;Shuffling;Decreased foot clearance   Gait Assistance 4  Minimal assist   Additional items Assist x 1;Verbal cues;Tactile cues   Assistive Device None   Distance 60 feet, 40 feet x 2, 50 feet x 2, 10 feet x 4.   Balance   Static Sitting Fair +   Dynamic Sitting Fair   Static Standing Fair -   Dynamic Standing Poor +   Ambulatory Poor +   Activity Tolerance   Activity Tolerance Patient tolerated treatment well   Assessment   Prognosis Good   Problem List Decreased strength;Decreased endurance;Impaired balance;Decreased mobility;Decreased coordination;Decreased cognition;Impaired judgement;Decreased safety awareness   Assessment The patient was found egressing from the bed with the alarm sounding. He demonstrates notable improvement in his mobility as he is attaining household distances with close supervision. He had no loss of balance, but he struggles in busy environments. Complex environments also require increased time for him to navigate with three instances of bumping into objects on the left side. He was in the chair post session with the alarm active and all needs within reach.   Barriers to Discharge Inaccessible home environment;Decreased  caregiver support   Goals   Patient Goals None expressed.   STG Expiration Date 09/13/24   PT Treatment Day 1   Plan   Treatment/Interventions Functional transfer training;LE strengthening/ROM;Therapeutic exercise;Endurance training;Cognitive reorientation;Patient/family training;Bed mobility;Gait training;Elevations   Progress Progressing toward goals   PT Frequency 2-3x/wk   Discharge Recommendation   Rehab Resource Intensity Level, PT II (Moderate Resource Intensity)   AM-PAC Basic Mobility Inpatient   Turning in Flat Bed Without Bedrails 4   Lying on Back to Sitting on Edge of Flat Bed Without Bedrails 4   Moving Bed to Chair 3   Standing Up From Chair Using Arms 3   Walk in Room 3   Climb 3-5 Stairs With Railing 3   Basic Mobility Inpatient Raw Score 20   Basic Mobility Standardized Score 43.99   [facility] Highest Level Of Mobility   -HLM Goal 6: Walk 10 steps or more   -HLM Achieved 8: Walk 250 feet ot more         An AM-PAC Basic Mobility raw score less than 16 suggests the patient may benefit from discharge to post-acute rehab services.    Rashid Josue, PTA

## 2024-09-03 NOTE — PROGRESS NOTES
"[facility]  Progress Note  Name: Edy Putnam I  MRN: [id number]  Unit/Bed#: [facility] 705-01 I Date of Admission: 8/29/2024   Date of Service: 9/3/2024 I Hospital Day: 4    Assessment & Plan   * Acute stroke due to ischemia (HCC)  Assessment & Plan  Pt presents from home for evaluation of AMS starting on  8/29.  No focal neurological deficits.   Baseline:patient able to do all chores around the house . Able to drive, cook, clean himself, pick out clothes  CTA head/ neck with no evidence of intercranial hemorrhage, Severe chronic microvascular ischemic change and superimposed chronic infarcts as detailed above. Age-indeterminate right temporal infarct, likely chronic.   EKG sinus without acute ischemic changes  CXR normal  MRI 8/31: Acute infarct involving the right temporal lobe with petechial hemorrhage.  -Small area of acute infarct within the left parietal periventricular white matter.  No clot on echo, tele was normal sinus no events  Placed on ASA and high intensity statin  loop recorder/zio as op  Fall and delirium precautions  Need placement for PT.  Pending Placement  Medically clear for discharge    History of stroke  Assessment & Plan  Wife reports a history of a \"[place]\" CVA in 2015 and patient has had short term memory deficits since then   Patient is reportedly on aspirin as well as \"simvastatin\" but wife unsure of medication doses  Aspirin, High Intensity statin   Records form [facility] where patient follows with Dr. Peters (neurology)    Dementia ([facility])  Assessment & Plan  Follows with [facility] and Neurologist Dr. Peters  Step down progression with most recent stroke. Worse short term memory per family  Spouse reports that over the past several months patient has been more forgetful with regards to medications and not able   Possible vascular dementia    S/P splenectomy  Assessment & Plan  Prev splenectomy    Pneumococcal pneumonia " ([facility])  Assessment & Plan  H/o Pneumococcal Pneumonia in the late    Patient had a splenectomy in the past             VTE Pharmacologic Prophylaxis: VTE Score: 9 High Risk (Score >/= 5) - Pharmacological DVT Prophylaxis Ordered: enoxaparin (Lovenox). Sequential Compression Devices Ordered.    Mobility:   Basic Mobility Inpatient Raw Score: 20  JH-HLM Goal: 6: Walk 10 steps or more  JH-HLM Achieved: 6: Walk 10 steps or more  JH-HLM Goal achieved. Continue to encourage appropriate mobility.    Patient Centered Rounds: I performed bedside rounds with nursing staff today.   Discussions with Specialists or Other Care Team Provider: KIMMIE    Education and Discussions with Family / Patient: Updated  (wife) at bedside.    Total Time Spent on Date of Encounter in care of patient: 30 mins. This time was spent on one or more of the following: performing physical exam; counseling and coordination of care; obtaining or reviewing history; documenting in the medical record; reviewing/ordering tests, medications or procedures; communicating with other healthcare professionals and discussing with patient's family/caregivers.    Current Length of Stay: 4 day(s)  Current Patient Status: Inpatient   Certification Statement: The patient will continue to require additional inpatient hospital stay due to awaiting rehab placement      Code Status: Level 3 - DNAR and DNI    Subjective:   Patient seen and examined  Comfortable in bed  No event overnight  No complaints    Objective:     Vitals:   Temp (24hrs), Av.5 °F (36.9 °C), Min:97.5 °F (36.4 °C), Max:99.3 °F (37.4 °C)    Temp:  [97.5 °F (36.4 °C)-99.3 °F (37.4 °C)] 99.3 °F (37.4 °C)  HR:  [68] 68  Resp:  [19-20] 19  BP: (112-116)/(61-77) 116/74  SpO2:  [96 %] 96 %  Body mass index is 24.53 kg/m².     Input and Output Summary (last 24 hours):     Intake/Output Summary (Last 24 hours) at 9/3/2024 1416  Last data filed at 9/3/2024 0800  Gross per 24 hour   Intake 230 ml    Output --   Net 230 ml       Physical Exam:   Physical Exam  Vitals reviewed.   Constitutional:       Appearance: Normal appearance. He is not ill-appearing.   HENT:      Head: Normocephalic and atraumatic.      Mouth/Throat:      Mouth: Mucous membranes are moist.      Pharynx: No oropharyngeal exudate.   Eyes:      General: No scleral icterus.     Conjunctiva/sclera: Conjunctivae normal.   Cardiovascular:      Rate and Rhythm: Normal rate and regular rhythm.      Pulses: Normal pulses.      Heart sounds: Normal heart sounds. No murmur heard.  Pulmonary:      Effort: Pulmonary effort is normal. No respiratory distress.      Breath sounds: Normal breath sounds. No wheezing.   Abdominal:      General: Bowel sounds are normal. There is no distension.      Palpations: Abdomen is soft.      Tenderness: There is no abdominal tenderness.   Musculoskeletal:         General: Normal range of motion.      Cervical back: Normal range of motion and neck supple.      Right lower leg: No edema.      Left lower leg: No edema.   Skin:     General: Skin is warm and dry.   Neurological:      Mental Status: He is alert. He is disoriented.            Additional Data:     Labs:  Results from last 7 days   Lab Units 09/02/24 0605 08/31/24  0806 08/30/24  0443   WBC Thousand/uL 9.58   < > 9.77   HEMOGLOBIN g/dL 13.8   < > 14.5   HEMATOCRIT % 41.2   < > 43.4   PLATELETS Thousands/uL 358   < > 353   SEGS PCT %  --   --  52   LYMPHO PCT %  --   --  30   MONO PCT %  --   --  12   EOS PCT %  --   --  5    < > = values in this interval not displayed.     Results from last 7 days   Lab Units 09/02/24 0605 08/31/24  0806 08/30/24  0443   SODIUM mmol/L 137   < > 136   POTASSIUM mmol/L 4.0   < > 3.8   CHLORIDE mmol/L 101   < > 101   CO2 mmol/L 27   < > 30   BUN mg/dL 14   < > 14   CREATININE mg/dL 0.97   < > 1.08   ANION GAP mmol/L 9   < > 5   CALCIUM mg/dL 8.6   < > 8.7   ALBUMIN g/dL  --   --  3.8   TOTAL BILIRUBIN mg/dL  --   --  1.70*   ALK  PHOS U/L  --   --  52   ALT U/L  --   --  9   AST U/L  --   --  17   GLUCOSE RANDOM mg/dL 89   < > 92    < > = values in this interval not displayed.     Results from last 7 days   Lab Units 08/30/24  0443   INR  1.02         Results from last 7 days   Lab Units 08/30/24  0443   HEMOGLOBIN A1C % 5.9*           Lines/Drains:  Invasive Devices       Peripheral Intravenous Line  Duration             Peripheral IV 09/03/24 Left;Ventral (anterior) Forearm <1 day                          Imaging: Reviewed    Recent Cultures (last 7 days):         Last 24 Hours Medication List:   Current Facility-Administered Medications   Medication Dose Route Frequency Provider Last Rate    aspirin  81 mg Oral Daily Jesse Canseco DO      atorvastatin  40 mg Oral QPM Jesse Canseco DO      vitamin B-12  1,000 mcg Oral Daily Adam Severino DO      cyanocobalamin  1,000 mcg Intramuscular Weekly Adam Severino DO      enoxaparin  40 mg Subcutaneous Q24H [facility] Richar Vidal DO      OLANZapine  5 mg Intramuscular Q6H PRN Jesse Canseco DO          Today, Patient Was Seen By: Richar Vidal DO    **Please Note: This note may have been constructed using a voice recognition system.**

## 2024-09-04 PROCEDURE — 97116 GAIT TRAINING THERAPY: CPT

## 2024-09-04 PROCEDURE — 97112 NEUROMUSCULAR REEDUCATION: CPT

## 2024-09-04 PROCEDURE — 99232 SBSQ HOSP IP/OBS MODERATE 35: CPT | Performed by: INTERNAL MEDICINE

## 2024-09-04 RX ORDER — AMOXICILLIN 250 MG
1 CAPSULE ORAL 2 TIMES DAILY
Status: DISCONTINUED | OUTPATIENT
Start: 2024-09-04 | End: 2024-09-11 | Stop reason: HOSPADM

## 2024-09-04 RX ADMIN — CYANOCOBALAMIN TAB 500 MCG 1000 MCG: 500 TAB at 08:45

## 2024-09-04 RX ADMIN — ENOXAPARIN SODIUM 40 MG: 40 INJECTION SUBCUTANEOUS at 08:45

## 2024-09-04 RX ADMIN — SENNOSIDES AND DOCUSATE SODIUM 1 TABLET: 50; 8.6 TABLET ORAL at 11:05

## 2024-09-04 RX ADMIN — SENNOSIDES AND DOCUSATE SODIUM 1 TABLET: 50; 8.6 TABLET ORAL at 18:27

## 2024-09-04 RX ADMIN — ASPIRIN 81 MG CHEWABLE TABLET 81 MG: 81 TABLET CHEWABLE at 08:45

## 2024-09-04 RX ADMIN — ATORVASTATIN CALCIUM 40 MG: 40 TABLET, FILM COATED ORAL at 18:26

## 2024-09-04 NOTE — PROGRESS NOTES
Patient:    MRN:  [id number]    [facility] Request ID:  [id number]    Level of care reserved:  Skilled Nursing Facility    Partner Reserved:  [facility], [place] [address] [phone]    Clinical needs requested:    Geography searched:  20 miles around [id number]    Start of Service:    Request sent:  2:05pm EDT on 9/1/2024 by Saniya Knight    Partner reserved:  12:30pm EDT on 9/4/2024 by Trish Thompson    Choice list shared:  12:30pm EDT on 9/4/2024 by Trish Thompson

## 2024-09-04 NOTE — PHYSICAL THERAPY NOTE
Physical Therapy Evaluation    Patient's Name: Edy Putnam    Admitting Diagnosis  Altered mental status [R41.82]  Altered mental state [R41.82]    Problem List  Patient Active Problem List   Diagnosis    Acute stroke due to ischemia (HCC)    History of stroke    Dementia (HCC)    Pneumococcal pneumonia (HCC)    S/P splenectomy    B12 deficiency       Past Medical History  History reviewed. No pertinent past medical history.    Past Surgical History  History reviewed. No pertinent surgical history.     09/04/24 1605   PT Last Visit   PT Visit Date 09/04/24   Note Type   Note Type Treatment   Pain Assessment   Pain Assessment Tool 0-10   Pain Score No Pain   Restrictions/Precautions   Weight Bearing Precautions Per Order No   Other Precautions Cognitive;Chair Alarm;Bed Alarm;Fall Risk   General   Chart Reviewed Yes   Response to Previous Treatment Patient with no complaints from previous session.   Family/Caregiver Present No   Cognition   Overall Cognitive Status Impaired   Arousal/Participation Alert   Orientation Level Oriented to person;Disoriented to place;Disoriented to time;Disoriented to situation   Comments pleasantly confused   Subjective   Subjective Agreeable to mobilize.   Bed Mobility   Additional Comments Pt greeted in chair.   Transfers   Sit to Stand 5  Supervision   Additional items Increased time required;Verbal cues   Stand to Sit 5  Supervision   Additional items Increased time required;Verbal cues   Additional Comments no AD   Ambulation/Elevation   Gait pattern Excessively slow;Short stride;Decreased foot clearance  (+bumping into objects)   Gait Assistance 4  Minimal assist   Additional items Assist x 1;Verbal cues;Tactile cues   Assistive Device None   Distance 75' + 50'x4   Stair Management Assistance 4  Minimal assist   Additional items Assist x 1;Verbal cues;Tactile cues   Stair Management Technique One rail L   Number of Stairs 7   Balance   Static Sitting Fair +   Dynamic Sitting  Fair   Static Standing Fair -   Dynamic Standing Poor +   Ambulatory Poor +   Endurance Deficit   Endurance Deficit Yes   Endurance Deficit Description fatigue   Activity Tolerance   Activity Tolerance Patient limited by fatigue   Exercises   Neuro re-ed Dynamic standing balance challenges, stepping in between cones in serpentine pattern, stepping up + over cones, retrieving cones from the floor. Walking backwards. Standing w/ eyes closed, turning 360 deg, resisting perturbations (stepping strategy used).   Assessment   Prognosis Good   Problem List Decreased strength;Decreased endurance;Impaired balance;Decreased mobility;Decreased coordination;Decreased cognition;Impaired judgement;Decreased safety awareness   Assessment Pt seen for PT treatment session w/ interventions consisting of t/f training, gait training, stair training, + dynamic standing balance instruction. Pt continues to demonstrate difficulty navigating busy environments w/ cues required for obstacle avoidance. Pt demonstrated no overt LOB w/ balance challnes but demonstrates difficulty w/ complex instructions (i.e. difficulty understanding to step up + over cone as opposed to around cone). From a PT standpoint continue to recommend rehab vs HHPT pending progress.   Goals   Patient Goals none expressed   PT Treatment Day 2   Plan   Treatment/Interventions LE strengthening/ROM;Functional transfer training;Elevations;Endurance training;Therapeutic exercise;Patient/family training;Equipment eval/education;Bed mobility;Gait training;Compensatory technique education   Progress Progressing toward goals   PT Frequency 2-3x/wk   Discharge Recommendation   Rehab Resource Intensity Level, PT II (Moderate Resource Intensity)   AM-PAC Basic Mobility Inpatient   Turning in Flat Bed Without Bedrails 3   Lying on Back to Sitting on Edge of Flat Bed Without Bedrails 3   Moving Bed to Chair 3   Standing Up From Chair Using Arms 3   Walk in Room 3   Climb 3-5 Stairs  With Railing 3   Basic Mobility Inpatient Raw Score 18   Basic Mobility Standardized Score 41.05   [facility] Highest Level Of Mobility   -HLM Goal 6: Walk 10 steps or more   -HLM Achieved 8: Walk 250 feet ot more   Education   Education Provided Mobility training   Patient Demonstrates acceptance/verbal understanding;Reinforcement needed   End of Consult   Patient Position at End of Consult Bedside chair;Bed/Chair alarm activated;All needs within reach       Brit Dowling, PT, DPT

## 2024-09-04 NOTE — PLAN OF CARE
Problem: PHYSICAL THERAPY ADULT  Goal: Performs mobility at highest level of function for planned discharge setting.  See evaluation for individualized goals.  Description: Treatment/Interventions: Functional transfer training, LE strengthening/ROM, Elevations, Therapeutic exercise, Endurance training, Cognitive reorientation, Patient/family training, Equipment eval/education, Bed mobility, Gait training, Spoke to nursing, OT  Equipment Recommended: Other (Comment) (TBD)       See flowsheet documentation for full assessment, interventions and recommendations.  Outcome: Progressing  Note: Prognosis: Good  Problem List: Decreased strength, Decreased endurance, Impaired balance, Decreased mobility, Decreased coordination, Decreased cognition, Impaired judgement, Decreased safety awareness  Assessment: Pt seen for PT treatment session w/ interventions consisting of t/f training, gait training, stair training, + dynamic standing balance instruction. Pt continues to demonstrate difficulty navigating busy environments w/ cues required for obstacle avoidance. Pt demonstrated no overt LOB w/ balance challnes but demonstrates difficulty w/ complex instructions (i.e. difficulty understanding to step up + over cone as opposed to around cone). From a PT standpoint continue to recommend rehab vs HHPT pending progress.  Barriers to Discharge: Inaccessible home environment, Decreased caregiver support     Rehab Resource Intensity Level, PT: II (Moderate Resource Intensity)    See flowsheet documentation for full assessment.

## 2024-09-04 NOTE — ASSESSMENT & PLAN NOTE
Follows with [facility] and Neurologist Dr. Peters  Step down progression with most recent stroke. Worse short term memory per family  Spouse reports that over the past several months patient has been more forgetful with regards to medications and not able   Possible vascular dementia

## 2024-09-04 NOTE — ASSESSMENT & PLAN NOTE
"Wife reports a history of a \"Watershed\" CVA in 2015 and patient has had short term memory deficits since then   Patient is reportedly on aspirin as well as \"simvastatin\" but wife unsure of medication doses  Aspirin, High Intensity statin   "

## 2024-09-04 NOTE — PROGRESS NOTES
"[facility]  Progress Note  Name: Edy Putnam I  MRN: [id number]  Unit/Bed#: [facility] 705-01 I Date of Admission: 8/29/2024   Date of Service: 9/4/2024 I Hospital Day: 5    Assessment & Plan   * Acute stroke due to ischemia (HCC)  Assessment & Plan  Pt presents from home for evaluation of AMS starting on  8/29.  No focal neurological deficits.   Baseline:patient able to do all chores around the house . Able to drive, cook, clean himself, pick out clothes  CTA head/ neck with no evidence of intercranial hemorrhage, Severe chronic microvascular ischemic change and superimposed chronic infarcts as detailed above. Age-indeterminate right temporal infarct, likely chronic.   EKG sinus without acute ischemic changes  CXR normal  MRI 8/31: Acute infarct involving the right temporal lobe with petechial hemorrhage.  -Small area of acute infarct within the left parietal periventricular white matter.  No clot on echo, tele was normal sinus no events  Placed on ASA and high intensity statin  loop recorder/zio as op  Fall and delirium precautions  Need placement for PT.  Pending Placement  Medically clear for discharge    History of stroke  Assessment & Plan  Wife reports a history of a \"Watershed\" CVA in 2015 and patient has had short term memory deficits since then   Patient is reportedly on aspirin as well as \"simvastatin\" but wife unsure of medication doses  Aspirin, High Intensity statin     Dementia (HCC)  Assessment & Plan  Follows with [facility] and Neurologist Dr. Peters  Step down progression with most recent stroke. Worse short term memory per family  Spouse reports that over the past several months patient has been more forgetful with regards to medications and not able   Possible vascular dementia    B12 deficiency  Assessment & Plan  Continue with B12 supplement             VTE Pharmacologic Prophylaxis: VTE Score: 9 High Risk (Score >/= 5) - Pharmacological DVT Prophylaxis " Ordered: enoxaparin (Lovenox). Sequential Compression Devices Ordered.    Mobility:   Basic Mobility Inpatient Raw Score: 20  JH-HLM Goal: 6: Walk 10 steps or more  JH-HLM Achieved: 4: Move to chair/commode  JH-HLM Goal NOT achieved. Continue with multidisciplinary rounding and encourage appropriate mobility to improve upon JH-HLM goals.    Patient Centered Rounds: I performed bedside rounds with nursing staff today.   Discussions with Specialists or Other Care Team Provider: CM    Education and Discussions with Family / Patient:  Will discuss with wife upon arrival.     Total Time Spent on Date of Encounter in care of patient: 30 mins. This time was spent on one or more of the following: performing physical exam; counseling and coordination of care; obtaining or reviewing history; documenting in the medical record; reviewing/ordering tests, medications or procedures; communicating with other healthcare professionals and discussing with patient's family/caregivers.    Current Length of Stay: 5 day(s)  Current Patient Status: Inpatient   Certification Statement: The patient will continue to require additional inpatient hospital stay due to rehab placement      Code Status: Level 3 - DNAR and DNI    Subjective:   Patient is comfortable in bed  No event overnight  No complaints    Objective:     Vitals:   Temp (24hrs), Av.1 °F (36.7 °C), Min:98 °F (36.7 °C), Max:98.2 °F (36.8 °C)    Temp:  [98 °F (36.7 °C)-98.2 °F (36.8 °C)] 98.2 °F (36.8 °C)  HR:  [65-68] 66  Resp:  [16-19] 18  BP: (100-135)/(54-72) 123/72  SpO2:  [95 %-97 %] 95 %  Body mass index is 24.53 kg/m².     Input and Output Summary (last 24 hours):     Intake/Output Summary (Last 24 hours) at 2024 1004  Last data filed at 2024 0730  Gross per 24 hour   Intake 820 ml   Output --   Net 820 ml       Physical Exam:   Physical Exam  Vitals reviewed.   Constitutional:       Appearance: Normal appearance. He is not ill-appearing.   HENT:      Head:  Normocephalic and atraumatic.      Mouth/Throat:      Mouth: Mucous membranes are moist.      Pharynx: No oropharyngeal exudate.   Eyes:      General: No scleral icterus.     Extraocular Movements: Extraocular movements intact.   Cardiovascular:      Rate and Rhythm: Normal rate and regular rhythm.      Pulses: Normal pulses.      Heart sounds: Normal heart sounds. No murmur heard.  Pulmonary:      Effort: Pulmonary effort is normal. No respiratory distress.      Breath sounds: Normal breath sounds. No wheezing.   Abdominal:      General: Bowel sounds are normal. There is no distension.      Palpations: Abdomen is soft.      Tenderness: There is no abdominal tenderness.   Musculoskeletal:      Cervical back: Normal range of motion and neck supple.      Right lower leg: No edema.      Left lower leg: No edema.   Skin:     General: Skin is warm and dry.   Neurological:      General: No focal deficit present.      Mental Status: He is alert. He is disoriented.   Psychiatric:         Mood and Affect: Mood normal.            Additional Data:     Labs:  Results from last 7 days   Lab Units 09/02/24  0605 08/31/24  0806 08/30/24  0443   WBC Thousand/uL 9.58   < > 9.77   HEMOGLOBIN g/dL 13.8   < > 14.5   HEMATOCRIT % 41.2   < > 43.4   PLATELETS Thousands/uL 358   < > 353   SEGS PCT %  --   --  52   LYMPHO PCT %  --   --  30   MONO PCT %  --   --  12   EOS PCT %  --   --  5    < > = values in this interval not displayed.     Results from last 7 days   Lab Units 09/02/24  0605 08/31/24  0806 08/30/24  0443   SODIUM mmol/L 137   < > 136   POTASSIUM mmol/L 4.0   < > 3.8   CHLORIDE mmol/L 101   < > 101   CO2 mmol/L 27   < > 30   BUN mg/dL 14   < > 14   CREATININE mg/dL 0.97   < > 1.08   ANION GAP mmol/L 9   < > 5   CALCIUM mg/dL 8.6   < > 8.7   ALBUMIN g/dL  --   --  3.8   TOTAL BILIRUBIN mg/dL  --   --  1.70*   ALK PHOS U/L  --   --  52   ALT U/L  --   --  9   AST U/L  --   --  17   GLUCOSE RANDOM mg/dL 89   < > 92    < > =  values in this interval not displayed.     Results from last 7 days   Lab Units 08/30/24  0443   INR  1.02         Results from last 7 days   Lab Units 08/30/24  0443   HEMOGLOBIN A1C % 5.9*           Lines/Drains:  Invasive Devices       Peripheral Intravenous Line  Duration             Peripheral IV 09/03/24 Left;Ventral (anterior) Forearm 1 day                          Imaging: No pertinent imaging reviewed.    Recent Cultures (last 7 days):         Last 24 Hours Medication List:   Current Facility-Administered Medications   Medication Dose Route Frequency Provider Last Rate    aspirin  81 mg Oral Daily Jesse Canseco DO      atorvastatin  40 mg Oral QPM Jesse Canseco DO      vitamin B-12  1,000 mcg Oral Daily Adam Severino DO      cyanocobalamin  1,000 mcg Intramuscular Weekly Adam Severino DO      enoxaparin  40 mg Subcutaneous Q24H [facility] Richar Vidal DO      OLANZapine  5 mg Intramuscular Q6H PRN Jesse Canseco DO      senna-docusate sodium  1 tablet Oral BID Richar Vidal DO          Today, Patient Was Seen By: Richar Vidal DO    **Please Note: This note may have been constructed using a voice recognition system.**

## 2024-09-04 NOTE — RESTORATIVE TECHNICIAN NOTE
Restorative Technician Note      Patient Name: Edy Putnam     Note Type: Mobility  Patient Position Upon Consult: Supine  Activity Performed: Ambulated; Dangled; Stood  Assistive Device: Roller walker  Education Provided: Yes  Patient Position at End of Consult: Bedside chair; All needs within reach; Bed/Chair alarm activated    Amandeep MILLER, Restorative Technician,

## 2024-09-04 NOTE — CASE MANAGEMENT
Case Management Discharge Planning Note    Patient name Edy Putnam  Location [facility] 705/[facility] 705-01 MRN [id number]  : 1945 Date 2024       Current Admission Date: 2024  Current Admission Diagnosis:Acute stroke due to ischemia (HCC)   Patient Active Problem List    Diagnosis Date Noted Date Diagnosed    B12 deficiency 2024     Pneumococcal pneumonia (HCC) 2024     S/P splenectomy 2024     Acute stroke due to ischemia (HCC) 2024     History of stroke 2024     Dementia (HCC) 2024       LOS (days): 5  Geometric Mean LOS (GMLOS) (days):   Days to GMLOS:     OBJECTIVE:  Risk of Unplanned Readmission Score: 9.97         Current admission status: Inpatient   Preferred Pharmacy:   PATIENT/FAMILY REPORTS NO PREFERRED PHARMACY  No address on file      Primary Care Provider: No primary care provider on file.    Primary Insurance: [facility]  Secondary Insurance: MEDICARE    DISCHARGE DETAILS:    Discharge planning discussed with:: Tylor (wife)             Other Referral/Resources/Interventions Provided:  Referral Comments: [facility]  Insurance auth. request sent to  dc support.         Treatment Team Recommendation: Short Term Rehab, SNF

## 2024-09-04 NOTE — CASE MANAGEMENT
Case Management Discharge Planning Note    Patient name Edy Putnam  Location [facility] 705/[facility] 705-01 MRN [id number]  : 1945 Date 2024       Current Admission Date: 2024  Current Admission Diagnosis:Acute stroke due to ischemia (HCC)   Patient Active Problem List    Diagnosis Date Noted Date Diagnosed    B12 deficiency 2024     Pneumococcal pneumonia (HCC) 2024     S/P splenectomy 2024     Acute stroke due to ischemia (HCC) 2024     History of stroke 2024     Dementia (HCC) 2024       LOS (days): 5  Geometric Mean LOS (GMLOS) (days):   Days to GMLOS:     OBJECTIVE:  Risk of Unplanned Readmission Score: 9.97         Current admission status: Inpatient   Preferred Pharmacy:   PATIENT/FAMILY REPORTS NO PREFERRED PHARMACY  No address on file      Primary Care Provider: No primary care provider on file.    Primary Insurance: [facility]  Secondary Insurance: MEDICARE    DISCHARGE DETAILS:    Discharge planning discussed with:: Tylor (wife)             Other Referral/Resources/Interventions Provided:  Referral Comments: [facility]  Insurance auth. request sent to  dc support.         Treatment Team Recommendation: Short Term Rehab, SNF

## 2024-09-05 ENCOUNTER — TELEPHONE (OUTPATIENT)
Age: 79
End: 2024-09-05

## 2024-09-05 PROCEDURE — 99232 SBSQ HOSP IP/OBS MODERATE 35: CPT | Performed by: INTERNAL MEDICINE

## 2024-09-05 PROCEDURE — 97535 SELF CARE MNGMENT TRAINING: CPT

## 2024-09-05 RX ADMIN — ATORVASTATIN CALCIUM 40 MG: 40 TABLET, FILM COATED ORAL at 17:14

## 2024-09-05 RX ADMIN — SENNOSIDES AND DOCUSATE SODIUM 1 TABLET: 50; 8.6 TABLET ORAL at 17:14

## 2024-09-05 RX ADMIN — CYANOCOBALAMIN TAB 500 MCG 1000 MCG: 500 TAB at 09:08

## 2024-09-05 RX ADMIN — ENOXAPARIN SODIUM 40 MG: 40 INJECTION SUBCUTANEOUS at 09:08

## 2024-09-05 RX ADMIN — ASPIRIN 81 MG CHEWABLE TABLET 81 MG: 81 TABLET CHEWABLE at 09:08

## 2024-09-05 NOTE — PLAN OF CARE
Problem: PAIN - ADULT  Goal: Verbalizes/displays adequate comfort level or baseline comfort level  Description: Interventions:  - Encourage patient to monitor pain and request assistance  - Assess pain using appropriate pain scale  - Administer analgesics based on type and severity of pain and evaluate response  - Implement non-pharmacological measures as appropriate and evaluate response  - Notify physician/advanced practitioner if interventions unsuccessful or patient reports new pain  Outcome: Progressing     Problem: INFECTION - ADULT  Goal: Absence or prevention of progression during hospitalization  Description: INTERVENTIONS:  - Assess and monitor for signs and symptoms of infection  - Monitor lab/diagnostic results  - Monitor all insertion sites, i.e. indwelling lines, tubes, and drains  - [place] appropriate cooling/warming therapies per order  - Administer medications as ordered  - Instruct and encourage patient and family to use good hand hygiene technique  - Identify and instruct in appropriate isolation precautions for identified infection/condition  Outcome: Progressing     Problem: SAFETY ADULT  Goal: Patient will remain free of falls  Description: INTERVENTIONS:  - Educate patient/family on patient safety including physical limitations  - Instruct patient to call for assistance with activity   - Consult OT/PT to assist with strengthening/mobility   - Keep Call bell within reach  - Keep bed low and locked with side rails adjusted as appropriate  - Keep care items and personal belongings within reach  - Initiate and maintain comfort rounds  - Make Fall Risk Sign visible to staff  - Offer Toileting every 2 Hours, in advance of need  - Initiate/Maintain bed/chair alarm  - Obtain necessary fall risk management equipment.  - Apply yellow socks and bracelet for high fall risk patients  - Consider moving patient to room near nurses station  Outcome: Progressing  Goal: Maintain or return to baseline ADL  function  Description: INTERVENTIONS:  -  Assess patient's ability to carry out ADLs; assess patient's baseline for ADL function and identify physical deficits which impact ability to perform ADLs (bathing, care of mouth/teeth, toileting, grooming, dressing, etc.)  - Assess/evaluate cause of self-care deficits   - Assess range of motion  - Assess patient's mobility; develop plan if impaired  - Assess patient's need for assistive devices and provide as appropriate  - Encourage maximum independence but intervene and supervise when necessary  - Involve family in performance of ADLs  - Assess for home care needs following discharge   - Consider OT consult to assist with ADL evaluation and planning for discharge  - Provide patient education as appropriate  Outcome: Progressing  Goal: Maintains/Returns to pre admission functional level  Description: INTERVENTIONS:  - Perform AM-PAC 6 Click Basic Mobility/ Daily Activity assessment daily.  - Set and communicate daily mobility goal to care team and patient/family/caregiver.   - Collaborate with rehabilitation services on mobility goals if consulted  - Dangle patient 3 times a day  - Stand patient 3 times a day  - Ambulate patient 3 times a day  - Out of bed to chair 3 times a day   - Out of bed for meals 3 times a day  - Out of bed for toileting  - Record patient progress and toleration of activity level   Outcome: Progressing     Problem: DISCHARGE PLANNING  Goal: Discharge to home or other facility with appropriate resources  Description: INTERVENTIONS:  - Identify barriers to discharge w/patient and caregiver  - Arrange for needed discharge resources and transportation as appropriate  - Identify discharge learning needs (meds, wound care, etc.)  - Refer to Case Management Department for coordinating discharge planning if the patient needs post-hospital services based on physician/advanced practitioner order or complex needs related to functional status, cognitive  ability, or social support system  Outcome: Progressing     Problem: Knowledge Deficit  Goal: Patient/family/caregiver demonstrates understanding of disease process, treatment plan, medications, and discharge instructions  Description: Complete learning assessment and assess knowledge base.  Interventions:  - Provide teaching at level of understanding  - Provide teaching via preferred learning methods  Outcome: Progressing     Problem: NEUROSENSORY - ADULT  Goal: Achieves stable or improved neurological status  Description: INTERVENTIONS  - Monitor and report changes in neurological status  - Monitor vital signs such as temperature, blood pressure, glucose, and any other labs ordered   - Initiate measures to prevent increased intracranial pressure  - Monitor for seizure activity and implement precautions if appropriate      Outcome: Progressing  Goal: Achieves maximal functionality and self care  Description: INTERVENTIONS  - Monitor swallowing and airway patency with patient fatigue and changes in neurological status  - Encourage and assist patient to increase activity and self care.   - Encourage visually impaired, hearing impaired and aphasic patients to use assistive/communication devices  Outcome: Progressing     Problem: CARDIOVASCULAR - ADULT  Goal: Maintains optimal cardiac output and hemodynamic stability  Description: INTERVENTIONS:  - Monitor I/O, vital signs and rhythm  - Monitor for S/S and trends of decreased cardiac output  - Administer and titrate ordered vasoactive medications to optimize hemodynamic stability  - Assess quality of pulses, skin color and temperature  - Assess for signs of decreased coronary artery perfusion  - Instruct patient to report change in severity of symptoms  Outcome: Progressing  Goal: Absence of cardiac dysrhythmias or at baseline rhythm  Description: INTERVENTIONS:  - Continuous cardiac monitoring, vital signs, obtain 12 lead EKG if ordered  - Administer antiarrhythmic  and heart rate control medications as ordered  - Monitor electrolytes and administer replacement therapy as ordered  Outcome: Progressing     Problem: Prexisting or High Potential for Compromised Skin Integrity  Goal: Skin integrity is maintained or improved  Description: INTERVENTIONS:  - Identify patients at risk for skin breakdown  - Assess and monitor skin integrity  - Assess and monitor nutrition and hydration status  - Monitor labs   - Assess for incontinence   - Turn and reposition patient  - Assist with mobility/ambulation  - Relieve pressure over bony prominences  - Avoid friction and shearing  - Provide appropriate hygiene as needed including keeping skin clean and dry  - Evaluate need for skin moisturizer/barrier cream  - Collaborate with interdisciplinary team   - Patient/family teaching  - Consider wound care consult   Outcome: Progressing     Problem: Neurological Deficit  Goal: Neurological status is stable or improving  Description: Interventions:  - Monitor and assess patient's level of consciousness, motor function, sensory function, and level of assistance needed for ADLs.   - Monitor and report changes from baseline. Collaborate with interdisciplinary team to initiate plan and implement interventions as ordered.   - Provide and maintain a safe environment.  - Consider seizure precautions.  - Consider fall precautions.  - Consider aspiration precautions.  - Consider bleeding precautions.  Outcome: Progressing

## 2024-09-05 NOTE — PLAN OF CARE
Problem: OCCUPATIONAL THERAPY ADULT  Goal: Performs self-care activities at highest level of function for planned discharge setting.  See evaluation for individualized goals.  Description: Treatment Interventions: ADL retraining  Equipment Recommended:  (pending progressing/social support)       See flowsheet documentation for full assessment, interventions and recommendations.   Outcome: Progressing  Note: Limitation: Decreased ADL status, Decreased Safe judgement during ADL, Decreased cognition, Decreased endurance, Decreased self-care trans, Decreased high-level ADLs  Prognosis: Fair  Assessment: Pt seen for Occupational Therapy session with focus on activity tolerance, bed mob, functional transfers/mob, sitting balance and tolerance and standing tolerance and balance for pt engagement in UB/LB self-care tasks and informal cognition.  Pt cleared by RUFINA/ Nilda for pt participated in OT session. Pt presented sitting out of bed to bedside chair and agreeable to participate in therapy following pt identifiers confirmed. Pt did not report a therapy goal this session however pt was pleasant and cooperative with all therapy requests.  Pt required assist for UB/LB self-care 2* limited safety awareness/requires cognitive support for short term memory deficits.  Pt recommend for II (Moderate Resources) vs OP OT for cognitive deficits. Pt set up to bedside chair post session, chair alarm activated and all needs within pt reach.     Rehab Resource Intensity Level, OT: II (Moderate Resource Intensity)

## 2024-09-05 NOTE — ASSESSMENT & PLAN NOTE
Pt presents from home for evaluation of AMS starting on  8/29.  No focal neurological deficits.   Baseline:patient able to do all chores around the house . Able to drive, cook, clean himself, pick out clothes  CTA head/ neck with no evidence of intercranial hemorrhage, Severe chronic microvascular ischemic change and superimposed chronic infarcts as detailed above. Age-indeterminate right temporal infarct, likely chronic.   EKG sinus without acute ischemic changes  CXR normal  MRI 8/31: Acute infarct involving the right temporal lobe with petechial hemorrhage.  -Small area of acute infarct within the left parietal periventricular white matter.  No clot on echo, tele was normal sinus no events  Placed on ASA and high intensity statin  loop recorder/zio as op  Fall and delirium precautions  Pending Placement  Continue PT OT  Medically clear for discharge

## 2024-09-05 NOTE — CASE MANAGEMENT
Called the [facility] to start prior auth for SNF . Spoke to alfred who stated that he does not have a primary care assigned to him. Case was being assigned to a  and the  will call me back . Campbell notified teresa roper

## 2024-09-05 NOTE — TELEPHONE ENCOUNTER
STILL ADMITTED     8/29/2024 - present (7 days)  [facility]    HFU/ SL DARLENE/ Acute stroke due to ischemia     DC-    ----- Message from Adam Severino DO sent at 8/31/2024 12:38 PM EDT -----  Regarding: HFU  Edy Putnam will need follow-up in 4-6 weeks with neurovascular team for acute ischemic stroke on right temporal and left parietal, likely cardioembolic, given prior stroke on cerebellar as well. in 60 minute appointment. They will not require outpatient neurological testing

## 2024-09-05 NOTE — OCCUPATIONAL THERAPY NOTE
Occupational Therapy Progress Note     Patient Name: Edy Putnam  Today's Date: 9/5/2024  Problem List  Principal Problem:    Acute stroke due to ischemia (HCC)  Active Problems:    History of stroke    Dementia (HCC)    Pneumococcal pneumonia (HCC)    S/P splenectomy    B12 deficiency         Occupational Therapy Treatment Note     09/05/24 1028   OT Last Visit   OT Visit Date 09/05/24   Note Type   Note Type Treatment for insurance authorization   Pain Assessment   Pain Assessment Tool FLACC   Pain Rating: FLACC (Rest) - Face 0   Pain Rating: FLACC (Rest) - Legs 0   Pain Rating: FLACC (Rest) - Activity 0   Pain Rating: FLACC (Rest) - Cry 0   Pain Rating: FLACC (Rest) - Consolability 0   Score: FLACC (Rest) 0   Pain Rating: FLACC (Activity) - Face 0   Pain Rating: FLACC (Activity) - Legs 0   Pain Rating: FLACC (Activity) - Activity 0   Pain Rating: FLACC (Activity) - Cry 0   Pain Rating: FLACC (Activity) - Consolability 0   Score: FLACC (Activity) 0   Restrictions/Precautions   Weight Bearing Precautions Per Order No   Lifestyle   Autonomy I with ADL's/IADL's, no AD with functional mobiltiy, +drives   Reciprocal Relationships spouse   Service to Others retired   Intrinsic Gratification doing homemaking tasks   ADL   Where Assessed Standing at sink   Grooming Assistance 5  Supervision/Setup   Grooming Deficit Wash/dry hands;Verbal cueing   UB Bathing Assistance 5  Supervision/Setup   LB Bathing Assistance 5  Supervision/Setup   UB Dressing Assistance 5  Supervision/Setup   LB Dressing Assistance 5  Supervision/Setup   LB Dressing Deficit Thread RLE into pants;Thread LLE into pants;Pull up over hips;Don/doff R sock;Don/doff L sock   Toileting Assistance  5  Supervision/Setup   Transfers   Sit to Stand 5  Supervision   Additional items Increased time required   Stand to Sit 5  Supervision   Additional items Increased time required   Functional Mobility   Functional Mobility 5  Supervision   Cognition   Overall  "Cognitive Status Impaired   Arousal/Participation Alert   Attention Attends with cues to redirect   Orientation Level Oriented to person;Oriented to place   Memory Decreased short term memory;Decreased recall of recent events;Decreased recall of precautions   Following Commands Follows one step commands with increased time or repetition   Comments pt stated \"April\" when asked for the current month. Was able to identify correct month with min Verbal cues \"last month was August what is the next month\"   Activity Tolerance   Activity Tolerance Patient tolerated treatment well   Assessment   Assessment Pt seen for Occupational Therapy session with focus on activity tolerance, bed mob, functional transfers/mob, sitting balance and tolerance and standing tolerance and balance for pt engagement in UB/LB self-care tasks and informal cognition.  Pt cleared by RUFINA/ Nilda for pt participated in OT session. Pt presented sitting out of bed to bedside chair and agreeable to participate in therapy following pt identifiers confirmed. Pt did not report a therapy goal this session however pt was pleasant and cooperative with all therapy requests.  Pt required assist for UB/LB self-care 2* limited safety awareness/requires cognitive support for short term memory deficits.  Pt recommend for II (Moderate Resources) vs OP OT for cognitive deficits. Pt set up to bedside chair post session, chair alarm activated and all needs within pt reach.   Plan   Treatment Interventions ADL retraining   Goal Expiration Date 09/13/24   OT Treatment Day 1   OT Frequency 2-3x/wk   Discharge Recommendation   Rehab Resource Intensity Level, OT II (Moderate Resource Intensity)   AM-PAC Daily Activity Inpatient   Lower Body Dressing 3   Bathing 3   Toileting 3   Upper Body Dressing 3   Grooming 3   Eating 3   Daily Activity Raw Score 18   Daily Activity Standardized Score (Calc for Raw Score >=11) 38.66   AM-PAC Applied Cognition Inpatient   Following a " Speech/Presentation 2   Understanding Ordinary Conversation 4   Taking Medications 2   Remembering Where Things Are Placed or Put Away 2   Remembering List of 4-5 Errands 1   Taking Care of Complicated Tasks 1   Applied Cognition Raw Score 12   Applied Cognition Standardized Score 28.82   Barthel Index   Grooming Score 5   Dressing Score 5   Toilet Use Score 5   Transfers (Bed/Chair) Score 5   Mobility (Level Surface) Score 0       Mercedes BOWEN/MARITZA

## 2024-09-05 NOTE — CASE MANAGEMENT
Spoke to  Vrea LAMAS @ [facility] ([phone]) who stated that she was going to message nurse tiffany and see if she can get her to return my call , she is also going to consult with Blanka hopkins (works with hospitals around here ) to see if theres anything she can do to help us out . Cm notified teresa roper

## 2024-09-05 NOTE — PLAN OF CARE
Problem: PAIN - ADULT  Goal: Verbalizes/displays adequate comfort level or baseline comfort level  Description: Interventions:  - Encourage patient to monitor pain and request assistance  - Assess pain using appropriate pain scale  - Administer analgesics based on type and severity of pain and evaluate response  - Implement non-pharmacological measures as appropriate and evaluate response  - Notify physician/advanced practitioner if interventions unsuccessful or patient reports new pain  Outcome: Progressing

## 2024-09-05 NOTE — ASSESSMENT & PLAN NOTE
Follows with [facility] and Neurologist Dr. Peters  Step down progression with most recent stroke. Worse short term memory per family  Spouse reports that over the past several months patient has been more forgetful with regards to medications and not able   Likely vascular dementia

## 2024-09-05 NOTE — CASE MANAGEMENT
Called the [facility]([phone]) and asked to be transferred to the [facility] ([phone]) spoke to Darling who stated that there is a Dr. tolliver listed as his PCP. Was transferred 7 times and then left a VM with nurse allen stating I need to put in a consult with his pcp for the snf auth. Campbell notified allyn roper

## 2024-09-05 NOTE — PROGRESS NOTES
"[facility]  Progress Note  Name: Edy Putnam I  MRN: [id number]  Unit/Bed#: [facility] 705-01 I Date of Admission: 8/29/2024   Date of Service: 9/5/2024 I Hospital Day: 6    Assessment & Plan   * Acute stroke due to ischemia (HCC)  Assessment & Plan  Pt presents from home for evaluation of AMS starting on  8/29.  No focal neurological deficits.   Baseline:patient able to do all chores around the house . Able to drive, cook, clean himself, pick out clothes  CTA head/ neck with no evidence of intercranial hemorrhage, Severe chronic microvascular ischemic change and superimposed chronic infarcts as detailed above. Age-indeterminate right temporal infarct, likely chronic.   EKG sinus without acute ischemic changes  CXR normal  MRI 8/31: Acute infarct involving the right temporal lobe with petechial hemorrhage.  -Small area of acute infarct within the left parietal periventricular white matter.  No clot on echo, tele was normal sinus no events  Placed on ASA and high intensity statin  loop recorder/zio as op  Fall and delirium precautions  Pending Placement  Continue PT OT  Medically clear for discharge    History of stroke  Assessment & Plan  Wife reports a history of a \"[place]\" CVA in 2015 and patient has had short term memory deficits since then   Patient is reportedly on aspirin as well as \"simvastatin\" but wife unsure of medication doses  Aspirin, High Intensity statin     Dementia (HCC)  Assessment & Plan  Follows with [facility] and Neurologist Dr. Peters  Step down progression with most recent stroke. Worse short term memory per family  Spouse reports that over the past several months patient has been more forgetful with regards to medications and not able   Likely vascular dementia    B12 deficiency  Assessment & Plan  Continue with B12 supplement           VTE Pharmacologic Prophylaxis: VTE Score: 9 High Risk (Score >/= 5) - Pharmacological DVT Prophylaxis Ordered: " enoxaparin (Lovenox). Sequential Compression Devices Ordered.    Mobility:   Basic Mobility Inpatient Raw Score: 18  JH-HLM Goal: 6: Walk 10 steps or more  JH-HLM Achieved: 7: Walk 25 feet or more  JH-HLM Goal achieved. Continue to encourage appropriate mobility.    Patient Centered Rounds: I performed bedside rounds with nursing staff today.   Discussions with Specialists or Other Care Team Provider: CM    Education and Discussions with Family / Patient: Updated  (wife) at bedside.  Yesterday    Total Time Spent on Date of Encounter in care of patient: 30 mins. This time was spent on one or more of the following: performing physical exam; counseling and coordination of care; obtaining or reviewing history; documenting in the medical record; reviewing/ordering tests, medications or procedures; communicating with other healthcare professionals and discussing with patient's family/caregivers.    Current Length of Stay: 6 day(s)  Current Patient Status: Inpatient   Certification Statement: The patient will continue to require additional inpatient hospital stay due to awaiting rehab placement      Code Status: Level 3 - DNAR and DNI    Subjective:   Patient seen and examined  Comfortable sitting in chair   No event overnight  No complaints      Objective:     Vitals:   Temp (24hrs), Av.2 °F (36.8 °C), Min:97.8 °F (36.6 °C), Max:98.5 °F (36.9 °C)    Temp:  [97.8 °F (36.6 °C)-98.5 °F (36.9 °C)] 98.5 °F (36.9 °C)  HR:  [61-71] 71  Resp:  [16-19] 19  BP: (120-123)/(73-75) 123/73  SpO2:  [93 %-96 %] 93 %  Body mass index is 24.53 kg/m².     Input and Output Summary (last 24 hours):   No intake or output data in the 24 hours ending 24 1439    Physical Exam:   Physical Exam  Vitals reviewed.   Constitutional:       Appearance: Normal appearance.   HENT:      Mouth/Throat:      Mouth: Mucous membranes are moist.   Eyes:      General: No scleral icterus.     Conjunctiva/sclera: Conjunctivae normal.    Cardiovascular:      Rate and Rhythm: Normal rate and regular rhythm.      Pulses: Normal pulses.      Heart sounds: Normal heart sounds. No murmur heard.  Pulmonary:      Effort: Pulmonary effort is normal. No respiratory distress.      Breath sounds: Normal breath sounds. No wheezing.   Abdominal:      General: Bowel sounds are normal. There is no distension.      Palpations: Abdomen is soft.      Tenderness: There is no abdominal tenderness.   Musculoskeletal:      Cervical back: Normal range of motion and neck supple.      Right lower leg: No edema.      Left lower leg: No edema.   Skin:     General: Skin is warm and dry.      Findings: No rash.   Neurological:      General: No focal deficit present.      Mental Status: He is alert. He is disoriented.          Additional Data:     Labs:  Results from last 7 days   Lab Units 09/02/24 0605 08/31/24 0806 08/30/24 0443   WBC Thousand/uL 9.58   < > 9.77   HEMOGLOBIN g/dL 13.8   < > 14.5   HEMATOCRIT % 41.2   < > 43.4   PLATELETS Thousands/uL 358   < > 353   SEGS PCT %  --   --  52   LYMPHO PCT %  --   --  30   MONO PCT %  --   --  12   EOS PCT %  --   --  5    < > = values in this interval not displayed.     Results from last 7 days   Lab Units 09/02/24 0605 08/31/24 0806 08/30/24 0443   SODIUM mmol/L 137   < > 136   POTASSIUM mmol/L 4.0   < > 3.8   CHLORIDE mmol/L 101   < > 101   CO2 mmol/L 27   < > 30   BUN mg/dL 14   < > 14   CREATININE mg/dL 0.97   < > 1.08   ANION GAP mmol/L 9   < > 5   CALCIUM mg/dL 8.6   < > 8.7   ALBUMIN g/dL  --   --  3.8   TOTAL BILIRUBIN mg/dL  --   --  1.70*   ALK PHOS U/L  --   --  52   ALT U/L  --   --  9   AST U/L  --   --  17   GLUCOSE RANDOM mg/dL 89   < > 92    < > = values in this interval not displayed.     Results from last 7 days   Lab Units 08/30/24 0443   INR  1.02         Results from last 7 days   Lab Units 08/30/24 0443   HEMOGLOBIN A1C % 5.9*           Lines/Drains:  Invasive Devices       Peripheral Intravenous  Line  Duration             Peripheral IV 09/03/24 Left;Ventral (anterior) Forearm 2 days                          Imaging: No pertinent imaging reviewed.    Recent Cultures (last 7 days):         Last 24 Hours Medication List:   Current Facility-Administered Medications   Medication Dose Route Frequency Provider Last Rate    aspirin  81 mg Oral Daily Jesse Canseco DO      atorvastatin  40 mg Oral QPM Jesse Canseco DO      vitamin B-12  1,000 mcg Oral Daily Adam Severino DO      cyanocobalamin  1,000 mcg Intramuscular Weekly Adam Severino DO      enoxaparin  40 mg Subcutaneous Q24H [facility] Richar Vidal DO      OLANZapine  5 mg Intramuscular Q6H PRN Jesse Canseco DO      senna-docusate sodium  1 tablet Oral BID Richar Vidal DO          Today, Patient Was Seen By: Richar Vidal DO    **Please Note: This note may have been constructed using a voice recognition system.**

## 2024-09-06 PROCEDURE — 99232 SBSQ HOSP IP/OBS MODERATE 35: CPT | Performed by: INTERNAL MEDICINE

## 2024-09-06 RX ADMIN — ENOXAPARIN SODIUM 40 MG: 40 INJECTION SUBCUTANEOUS at 10:00

## 2024-09-06 RX ADMIN — ATORVASTATIN CALCIUM 40 MG: 40 TABLET, FILM COATED ORAL at 17:12

## 2024-09-06 RX ADMIN — SENNOSIDES AND DOCUSATE SODIUM 1 TABLET: 50; 8.6 TABLET ORAL at 10:00

## 2024-09-06 RX ADMIN — ASPIRIN 81 MG CHEWABLE TABLET 81 MG: 81 TABLET CHEWABLE at 10:00

## 2024-09-06 RX ADMIN — SENNOSIDES AND DOCUSATE SODIUM 1 TABLET: 50; 8.6 TABLET ORAL at 17:12

## 2024-09-06 RX ADMIN — CYANOCOBALAMIN TAB 500 MCG 1000 MCG: 500 TAB at 10:00

## 2024-09-06 NOTE — CASE MANAGEMENT
Received call from Tami at [facility] .. Stated this patient goes through [facility] through the [facility] . Tami said she knows that [facility] is big on not approving , stated if we have difficulty with them saying that they wont approve .We can call her back and she will try to get it approved on her end , auth just has to go through his clinic first before mio mendez would be able to help out at all.  Tami p: [phone] ex [phone] cm notiifed teresa roper

## 2024-09-06 NOTE — ASSESSMENT & PLAN NOTE
Follows with [facility] and Neurologist Dr. Peters  Step down progression with most recent stroke. Worse short term memory per family  Spouse reports that over the past several months patient has been more forgetful with regards to medications and not able   Likely vascular dementia

## 2024-09-06 NOTE — RESTORATIVE TECHNICIAN NOTE
Restorative Technician Note      Patient Name: Edy Putnam     Note Type: Mobility  Patient Position Upon Consult: Bedside chair  Activity Performed: Ambulated; Dangled; Stood  Assistive Device: Roller walker  Education Provided: Yes  Patient Position at End of Consult: Bedside chair; Bed/Chair alarm activated; All needs within reach      Amandeep MILLER, Restorative Technician, [facility]

## 2024-09-06 NOTE — CASE MANAGEMENT
Call made into Nurse allen P[facility][phone] to [facility] with [facility]s for [facility] auth request. Left message asking for call back.    KIMMIE notified: Jacquie SIMEON

## 2024-09-06 NOTE — PROGRESS NOTES
"[facility]  Progress Note  Name: Edy Putnam I  MRN: [id number]  Unit/Bed#: [facility] 705-01 I Date of Admission: 8/29/2024   Date of Service: 9/6/2024 I Hospital Day: 7    Assessment & Plan   * Acute stroke due to ischemia (HCC)  Assessment & Plan  Pt presents from home for evaluation of AMS starting on  8/29.  No focal neurological deficits.   Baseline:patient able to do all chores around the house . Able to drive, cook, clean himself, pick out clothes  CTA head/ neck with no evidence of intercranial hemorrhage, Severe chronic microvascular ischemic change and superimposed chronic infarcts as detailed above. Age-indeterminate right temporal infarct, likely chronic.   EKG sinus without acute ischemic changes  CXR normal  MRI 8/31: Acute infarct involving the right temporal lobe with petechial hemorrhage.  -Small area of acute infarct within the left parietal periventricular white matter.  No clot on echo, tele was normal sinus no events  Placed on ASA and high intensity statin  loop recorder/zio as op  Fall and delirium precautions  Pending Placement  Continue PT OT  Medically clear for discharge    History of stroke  Assessment & Plan  Wife reports a history of a \"Watershed\" CVA in 2015 and patient has had short term memory deficits since then   Patient is reportedly on aspirin as well as \"simvastatin\" but wife unsure of medication doses  Aspirin, High Intensity statin     Dementia (HCC)  Assessment & Plan  Follows with [facility] and Neurologist Dr. Peters  Step down progression with most recent stroke. Worse short term memory per family  Spouse reports that over the past several months patient has been more forgetful with regards to medications and not able   Likely vascular dementia    B12 deficiency  Assessment & Plan  Continue with B12 supplement             VTE Pharmacologic Prophylaxis: VTE Score: 9 High Risk (Score >/= 5) - Pharmacological DVT Prophylaxis Ordered: " enoxaparin (Lovenox). Sequential Compression Devices Ordered.    Mobility:   Basic Mobility Inpatient Raw Score: 18  JH-HLM Goal: 6: Walk 10 steps or more  JH-HLM Achieved: 6: Walk 10 steps or more  JH-HLM Goal achieved. Continue to encourage appropriate mobility.    Patient Centered Rounds: I performed bedside rounds with nursing staff today.   Discussions with Specialists or Other Care Team Provider: KIMMIE    Total Time Spent on Date of Encounter in care of patient: 30 mins. This time was spent on one or more of the following: performing physical exam; counseling and coordination of care; obtaining or reviewing history; documenting in the medical record; reviewing/ordering tests, medications or procedures; communicating with other healthcare professionals and discussing with patient's family/caregivers.    Current Length of Stay: 7 day(s)  Current Patient Status: Inpatient   Certification Statement: The patient will continue to require additional inpatient hospital stay due to Awaiting rehab placement      Code Status: Level 3 - DNAR and DNI    Subjective:   Patient seen and examined  Comfortable in chair  No complain  No event overnight    Objective:     Vitals:   Temp (24hrs), Av.2 °F (36.8 °C), Min:98.1 °F (36.7 °C), Max:98.2 °F (36.8 °C)    Temp:  [98.1 °F (36.7 °C)-98.2 °F (36.8 °C)] 98.2 °F (36.8 °C)  HR:  [58-70] 66  Resp:  [16] 16  BP: (116-131)/(72-75) 126/73  SpO2:  [94 %-99 %] 94 %  Body mass index is 24.53 kg/m².     Input and Output Summary (last 24 hours):   No intake or output data in the 24 hours ending 24 1109    Physical Exam:   Physical Exam  Vitals reviewed.   Constitutional:       Appearance: Normal appearance. He is not ill-appearing.   Eyes:      General: No scleral icterus.  Cardiovascular:      Rate and Rhythm: Normal rate and regular rhythm.      Pulses: Normal pulses.      Heart sounds: Normal heart sounds. No murmur heard.  Pulmonary:      Effort: No respiratory distress.       Breath sounds: Normal breath sounds. No wheezing.   Abdominal:      General: Bowel sounds are normal. There is no distension.      Palpations: Abdomen is soft.      Tenderness: There is no abdominal tenderness.   Musculoskeletal:      Cervical back: Normal range of motion and neck supple.      Right lower leg: No edema.      Left lower leg: No edema.   Skin:     General: Skin is warm and dry.   Neurological:      General: No focal deficit present.      Mental Status: He is alert. He is disoriented.            Additional Data:     Labs:  Results from last 7 days   Lab Units 09/02/24  0605   WBC Thousand/uL 9.58   HEMOGLOBIN g/dL 13.8   HEMATOCRIT % 41.2   PLATELETS Thousands/uL 358     Results from last 7 days   Lab Units 09/02/24  0605   SODIUM mmol/L 137   POTASSIUM mmol/L 4.0   CHLORIDE mmol/L 101   CO2 mmol/L 27   BUN mg/dL 14   CREATININE mg/dL 0.97   ANION GAP mmol/L 9   CALCIUM mg/dL 8.6   GLUCOSE RANDOM mg/dL 89                       Lines/Drains:  Invasive Devices       Peripheral Intravenous Line  Duration             Peripheral IV 09/03/24 Left;Ventral (anterior) Forearm 3 days                          Imaging: No pertinent imaging reviewed.    Recent Cultures (last 7 days):         Last 24 Hours Medication List:   Current Facility-Administered Medications   Medication Dose Route Frequency Provider Last Rate    aspirin  81 mg Oral Daily Jesse Canseco DO      atorvastatin  40 mg Oral QPM Jesse Canseco DO      vitamin B-12  1,000 mcg Oral Daily Adam Severino DO      cyanocobalamin  1,000 mcg Intramuscular Weekly Adam Severino DO      enoxaparin  40 mg Subcutaneous Q24H [facility] Richar Vidal DO      OLANZapine  5 mg Intramuscular Q6H PRN Jesse Canseco DO      senna-docusate sodium  1 tablet Oral BID Richar Vidal DO          Today, Patient Was Seen By: Richar Vidal DO    **Please Note: This note may have been constructed using a voice recognition system.**

## 2024-09-06 NOTE — CASE MANAGEMENT
Received call from Daly [facility] be [facility]ed over to her to review with the dr . Fax # [phone]  Nurse daly P:[phone]    [facility] received request for authorization from Care Manager.  Authorization request submitted for: [facility]  Facility Name:   [facility] NPI:[id number]  Facility MD:  Dr. Hess     NPI: [id number]  Authorization initiated by contacting insurance[facility]  [facility]   Via: Fax  Clinicals submitted via fax # [phone]  Pending Reference #:n/a    Care Manager notified: teresa roper     Updates to authorization status will be noted in chart. Please reach out to CM for updates on any clinical information.

## 2024-09-07 PROCEDURE — 99232 SBSQ HOSP IP/OBS MODERATE 35: CPT | Performed by: INTERNAL MEDICINE

## 2024-09-07 RX ADMIN — CYANOCOBALAMIN TAB 500 MCG 1000 MCG: 500 TAB at 08:02

## 2024-09-07 RX ADMIN — SENNOSIDES AND DOCUSATE SODIUM 1 TABLET: 50; 8.6 TABLET ORAL at 17:01

## 2024-09-07 RX ADMIN — CYANOCOBALAMIN 1000 MCG: 1000 INJECTION, SOLUTION INTRAMUSCULAR; SUBCUTANEOUS at 08:06

## 2024-09-07 RX ADMIN — ENOXAPARIN SODIUM 40 MG: 40 INJECTION SUBCUTANEOUS at 08:02

## 2024-09-07 RX ADMIN — ASPIRIN 81 MG CHEWABLE TABLET 81 MG: 81 TABLET CHEWABLE at 08:02

## 2024-09-07 RX ADMIN — ATORVASTATIN CALCIUM 40 MG: 40 TABLET, FILM COATED ORAL at 17:01

## 2024-09-07 RX ADMIN — SENNOSIDES AND DOCUSATE SODIUM 1 TABLET: 50; 8.6 TABLET ORAL at 08:02

## 2024-09-07 NOTE — ASSESSMENT & PLAN NOTE
Follows with [facility] and Neurologist Dr. Peters  Step down progression with most recent stroke. Worse short term memory per family  Spouse reports that over the past several months patient has been more forgetful with regards to medications and not able   Likely vascular dementia

## 2024-09-07 NOTE — PROGRESS NOTES
"[facility]  Progress Note  Name: Edy Putnam I  MRN: [id number]  Unit/Bed#: [facility] 705-01 I Date of Admission: 8/29/2024   Date of Service: 9/7/2024 I Hospital Day: 8    Assessment & Plan   * Acute stroke due to ischemia (HCC)  Assessment & Plan  Pt presents from home for evaluation of AMS starting on  8/29.  No focal neurological deficits.   Baseline:patient able to do all chores around the house . Able to drive, cook, clean himself, pick out clothes  CTA head/ neck with no evidence of intercranial hemorrhage, Severe chronic microvascular ischemic change and superimposed chronic infarcts as detailed above. Age-indeterminate right temporal infarct, likely chronic.   EKG sinus without acute ischemic changes  CXR normal  MRI 8/31: Acute infarct involving the right temporal lobe with petechial hemorrhage.  -Small area of acute infarct within the left parietal periventricular white matter.  No clot on echo, tele was normal sinus no events  Placed on ASA and high intensity statin  Loop recorder/zio patch as op  Fall and delirium precautions  Pending Placement  Continue PT OT  Medically clear for discharge,  is following    History of stroke  Assessment & Plan  Wife reports a history of a \"Watershed\" CVA in 2015 and patient has had short term memory deficits since then   Patient is reportedly on aspirin as well as \"simvastatin\" but wife unsure of medication doses  Aspirin, High Intensity statin     Dementia (HCC)  Assessment & Plan  Follows with [facility] and Neurologist Dr. Peters  Step down progression with most recent stroke. Worse short term memory per family  Spouse reports that over the past several months patient has been more forgetful with regards to medications and not able   Likely vascular dementia    B12 deficiency  Assessment & Plan  Continue with B12 supplement             VTE Pharmacologic Prophylaxis: VTE Score: 9 High Risk (Score >/= 5) - " Pharmacological DVT Prophylaxis Ordered: enoxaparin (Lovenox). Sequential Compression Devices Ordered.    Mobility:   Basic Mobility Inpatient Raw Score: 19  JH-HLM Goal: 6: Walk 10 steps or more  JH-HLM Achieved: 7: Walk 25 feet or more  JH-HLM Goal achieved. Continue to encourage appropriate mobility.    Patient Centered Rounds: I performed bedside rounds with nursing staff today.   Discussions with Specialists or Other Care Team Provider: Nursing, CM    Education and Discussions with Family / Patient: Updated  (daughter) at bedside.    Total Time Spent on Date of Encounter in care of patient: 30 mins. This time was spent on one or more of the following: performing physical exam; counseling and coordination of care; obtaining or reviewing history; documenting in the medical record; reviewing/ordering tests, medications or procedures; communicating with other healthcare professionals and discussing with patient's family/caregivers.    Current Length of Stay: 8 day(s)  Current Patient Status: Inpatient   Certification Statement: The patient will continue to require additional inpatient hospital stay due to awaiting rehab placement    Code Status: Level 3 - DNAR and DNI    Subjective:   Patient seen and examined  Comfortable in bed  No event overnight  No complaints    Family at bedside    Objective:     Vitals:   Temp (24hrs), Av °F (36.7 °C), Min:98 °F (36.7 °C), Max:98.1 °F (36.7 °C)    Temp:  [98 °F (36.7 °C)-98.1 °F (36.7 °C)] 98.1 °F (36.7 °C)  HR:  [64-67] 67  Resp:  [16] 16  BP: (124-125)/(73-76) 124/76  SpO2:  [94 %-99 %] 94 %  Body mass index is 24.53 kg/m².     Input and Output Summary (last 24 hours):     Intake/Output Summary (Last 24 hours) at 2024 1202  Last data filed at 2024 1831  Gross per 24 hour   Intake 240 ml   Output --   Net 240 ml       Physical Exam:   Physical Exam   Patient is awake alert  no acute distress  Comfortable in bed  Lung clear to auscultation  bilateral  Heart positive S1 S2 no murmur  Abdomen soft nontender  Lower extremities no edema    Additional Data:     Labs:  Results from last 7 days   Lab Units 09/02/24  0605   WBC Thousand/uL 9.58   HEMOGLOBIN g/dL 13.8   HEMATOCRIT % 41.2   PLATELETS Thousands/uL 358     Results from last 7 days   Lab Units 09/02/24  0605   SODIUM mmol/L 137   POTASSIUM mmol/L 4.0   CHLORIDE mmol/L 101   CO2 mmol/L 27   BUN mg/dL 14   CREATININE mg/dL 0.97   ANION GAP mmol/L 9   CALCIUM mg/dL 8.6   GLUCOSE RANDOM mg/dL 89                       Lines/Drains:  Invasive Devices       Peripheral Intravenous Line  Duration             Peripheral IV 09/03/24 Left;Ventral (anterior) Forearm 4 days                          Imaging: No pertinent imaging reviewed.    Recent Cultures (last 7 days):         Last 24 Hours Medication List:   Current Facility-Administered Medications   Medication Dose Route Frequency Provider Last Rate    aspirin  81 mg Oral Daily Jesse Canseco DO      atorvastatin  40 mg Oral QPM Jesse Canseco DO      vitamin B-12  1,000 mcg Oral Daily Adam Severino DO      cyanocobalamin  1,000 mcg Intramuscular Weekly Adam Severino DO      enoxaparin  40 mg Subcutaneous Q24H [facility] Richar Vidal DO      OLANZapine  5 mg Intramuscular Q6H PRN Jesse Canseco DO      senna-docusate sodium  1 tablet Oral BID Richar Vidal DO          Today, Patient Was Seen By: Richar Vidal DO    **Please Note: This note may have been constructed using a voice recognition system.**

## 2024-09-07 NOTE — PLAN OF CARE
Problem: PAIN - ADULT  Goal: Verbalizes/displays adequate comfort level or baseline comfort level  Description: Interventions:  - Encourage patient to monitor pain and request assistance  - Assess pain using appropriate pain scale  - Administer analgesics based on type and severity of pain and evaluate response  - Implement non-pharmacological measures as appropriate and evaluate response  - Notify physician/advanced practitioner if interventions unsuccessful or patient reports new pain  Outcome: Progressing     Problem: INFECTION - ADULT  Goal: Absence or prevention of progression during hospitalization  Description: INTERVENTIONS:  - Assess and monitor for signs and symptoms of infection  - Monitor lab/diagnostic results  - Monitor all insertion sites, i.e. indwelling lines, tubes, and drains  - [place] appropriate cooling/warming therapies per order  - Administer medications as ordered  - Instruct and encourage patient and family to use good hand hygiene technique  - Identify and instruct in appropriate isolation precautions for identified infection/condition  Outcome: Progressing     Problem: SAFETY ADULT  Goal: Patient will remain free of falls  Description: INTERVENTIONS:  - Educate patient/family on patient safety including physical limitations  - Instruct patient to call for assistance with activity   - Consult OT/PT to assist with strengthening/mobility   - Keep Call bell within reach  - Keep bed low and locked with side rails adjusted as appropriate  - Keep care items and personal belongings within reach  - Initiate and maintain comfort rounds  - Make Fall Risk Sign visible to staff  - Offer Toileting every 2 Hours, in advance of need  - Initiate/Maintain bed/chair alarm  - Obtain necessary fall risk management equipment.  - Apply yellow socks and bracelet for high fall risk patients  - Consider moving patient to room near nurses station  Outcome: Progressing  Goal: Maintain or return to baseline ADL  function  Description: INTERVENTIONS:  -  Assess patient's ability to carry out ADLs; assess patient's baseline for ADL function and identify physical deficits which impact ability to perform ADLs (bathing, care of mouth/teeth, toileting, grooming, dressing, etc.)  - Assess/evaluate cause of self-care deficits   - Assess range of motion  - Assess patient's mobility; develop plan if impaired  - Assess patient's need for assistive devices and provide as appropriate  - Encourage maximum independence but intervene and supervise when necessary  - Involve family in performance of ADLs  - Assess for home care needs following discharge   - Consider OT consult to assist with ADL evaluation and planning for discharge  - Provide patient education as appropriate  Outcome: Progressing  Goal: Maintains/Returns to pre admission functional level  Description: INTERVENTIONS:  - Perform AM-PAC 6 Click Basic Mobility/ Daily Activity assessment daily.  - Set and communicate daily mobility goal to care team and patient/family/caregiver.   - Collaborate with rehabilitation services on mobility goals if consulted  - Dangle patient 3 times a day  - Stand patient 3 times a day  - Ambulate patient 3 times a day  - Out of bed to chair 3 times a day   - Out of bed for meals 3 times a day  - Out of bed for toileting  - Record patient progress and toleration of activity level   Outcome: Progressing     Problem: DISCHARGE PLANNING  Goal: Discharge to home or other facility with appropriate resources  Description: INTERVENTIONS:  - Identify barriers to discharge w/patient and caregiver  - Arrange for needed discharge resources and transportation as appropriate  - Identify discharge learning needs (meds, wound care, etc.)  - Refer to Case Management Department for coordinating discharge planning if the patient needs post-hospital services based on physician/advanced practitioner order or complex needs related to functional status, cognitive  ability, or social support system  Outcome: Progressing

## 2024-09-07 NOTE — ASSESSMENT & PLAN NOTE
Pt presents from home for evaluation of AMS starting on  8/29.  No focal neurological deficits.   Baseline:patient able to do all chores around the house . Able to drive, cook, clean himself, pick out clothes  CTA head/ neck with no evidence of intercranial hemorrhage, Severe chronic microvascular ischemic change and superimposed chronic infarcts as detailed above. Age-indeterminate right temporal infarct, likely chronic.   EKG sinus without acute ischemic changes  CXR normal  MRI 8/31: Acute infarct involving the right temporal lobe with petechial hemorrhage.  -Small area of acute infarct within the left parietal periventricular white matter.  No clot on echo, tele was normal sinus no events  Placed on ASA and high intensity statin  Loop recorder/zio patch as op  Fall and delirium precautions  Pending Placement  Continue PT OT  Medically clear for discharge,  is following

## 2024-09-07 NOTE — CASE MANAGEMENT
Case Management Discharge Planning Note    Patient name Edy Putnam  Location [facility] 705/[facility] 705-01 MRN [id number]  : 1945 Date 2024       Current Admission Date: 2024  Current Admission Diagnosis:Acute stroke due to ischemia (HCC)   Patient Active Problem List    Diagnosis Date Noted Date Diagnosed    B12 deficiency 2024     Pneumococcal pneumonia (HCC) 2024     S/P splenectomy 2024     Acute stroke due to ischemia (HCC) 2024     History of stroke 2024     Dementia (HCC) 2024       LOS (days): 8  Geometric Mean LOS (GMLOS) (days):   Days to GMLOS:     OBJECTIVE:  Risk of Unplanned Readmission Score: 10.43         Current admission status: Inpatient   Preferred Pharmacy:   PATIENT/FAMILY REPORTS NO PREFERRED PHARMACY  No address on file      Primary Care Provider: No primary care provider on file.    [facility]  Secondary Insurance: MEDICARE    DISCHARGE DETAILS:     CM attempted to call [facility], [phone], option 5. Received message that they are not open until Monday 8-4:30.  Unable to leave a message.  CM will continue to follow.  Auth has been pending since Tuesday.      [facility] to speak [facility] about [facility].

## 2024-09-08 PROCEDURE — 99232 SBSQ HOSP IP/OBS MODERATE 35: CPT | Performed by: INTERNAL MEDICINE

## 2024-09-08 RX ADMIN — ENOXAPARIN SODIUM 40 MG: 40 INJECTION SUBCUTANEOUS at 08:46

## 2024-09-08 RX ADMIN — SENNOSIDES AND DOCUSATE SODIUM 1 TABLET: 50; 8.6 TABLET ORAL at 18:26

## 2024-09-08 RX ADMIN — ATORVASTATIN CALCIUM 40 MG: 40 TABLET, FILM COATED ORAL at 18:26

## 2024-09-08 RX ADMIN — ASPIRIN 81 MG CHEWABLE TABLET 81 MG: 81 TABLET CHEWABLE at 08:46

## 2024-09-08 RX ADMIN — CYANOCOBALAMIN TAB 500 MCG 1000 MCG: 500 TAB at 08:46

## 2024-09-08 RX ADMIN — SENNOSIDES AND DOCUSATE SODIUM 1 TABLET: 50; 8.6 TABLET ORAL at 08:46

## 2024-09-08 NOTE — PROGRESS NOTES
"[facility]  Progress Note  Name: Edy Putnam I  MRN: [id number]  Unit/Bed#: [facility] 705-01 I Date of Admission: 8/29/2024   Date of Service: 9/8/2024 I Hospital Day: 9    Assessment & Plan   * Acute stroke due to ischemia (HCC)  Assessment & Plan  Pt presents from home for evaluation of AMS starting on  8/29.  No focal neurological deficits.   Baseline:patient able to do all chores around the house . Able to drive, cook, clean himself, pick out clothes  CTA head/ neck with no evidence of intercranial hemorrhage, Severe chronic microvascular ischemic change and superimposed chronic infarcts as detailed above. Age-indeterminate right temporal infarct, likely chronic.   EKG sinus without acute ischemic changes  CXR normal  MRI 8/31: Acute infarct involving the right temporal lobe with petechial hemorrhage.  -Small area of acute infarct within the left parietal periventricular white matter.  No clot on echo, tele was normal sinus no events  Placed on ASA and high intensity statin  Loop recorder/zio patch as op  Fall and delirium precautions  Pending Placement  Continue PT OT  Medically clear for discharge,  is following    History of stroke  Assessment & Plan  Wife reports a history of a \"[place]\" CVA in 2015 and patient has had short term memory deficits since then   Patient is reportedly on aspirin as well as \"simvastatin\" but wife unsure of medication doses  Aspirin, High Intensity statin     Dementia (HCC)  Assessment & Plan  Follows with [facility] and Neurologist Dr. Peters  Step down progression with most recent stroke. Worse short term memory per family  Spouse reports that over the past several months patient has been more forgetful with regards to medications and not able   Likely vascular dementia    B12 deficiency  Assessment & Plan  Continue with B12 supplement             VTE Pharmacologic Prophylaxis: VTE Score: 9 High Risk (Score >/= 5) - " Pharmacological DVT Prophylaxis Ordered: enoxaparin (Lovenox). Sequential Compression Devices Ordered.    Mobility:   Basic Mobility Inpatient Raw Score: 19  JH-HLM Goal: 6: Walk 10 steps or more  JH-HLM Achieved: 6: Walk 10 steps or more  JH-HLM Goal achieved. Continue to encourage appropriate mobility.    Patient Centered Rounds: I performed bedside rounds with nursing staff today.   Discussions with Specialists or Other Care Team Provider: Nursing    Education and Discussions with Family / Patient:  Patient.     Total Time Spent on Date of Encounter in care of patient: 30 mins. This time was spent on one or more of the following: performing physical exam; counseling and coordination of care; obtaining or reviewing history; documenting in the medical record; reviewing/ordering tests, medications or procedures; communicating with other healthcare professionals and discussing with patient's family/caregivers.    Current Length of Stay: 9 day(s)  Current Patient Status: Inpatient   Certification Statement: The patient will continue to require additional inpatient hospital stay due to awaiting rehab placement      Code Status: Level 3 - DNAR and DNI    Subjective:   Patient seen and examined  Frustrated from being in the hospital  No complaint    Objective:     Vitals:   Temp (24hrs), Av.2 °F (36.8 °C), Min:97.9 °F (36.6 °C), Max:98.6 °F (37 °C)    Temp:  [97.9 °F (36.6 °C)-98.6 °F (37 °C)] 98 °F (36.7 °C)  HR:  [60-64] 64  Resp:  [18] 18  BP: (125-127)/(71-75) 127/75  SpO2:  [94 %-98 %] 94 %  Body mass index is 24.53 kg/m².     Input and Output Summary (last 24 hours):     Intake/Output Summary (Last 24 hours) at 2024 0941  Last data filed at 2024 1809  Gross per 24 hour   Intake 720 ml   Output --   Net 720 ml       Physical Exam:   Physical Exam  Vitals reviewed.   Constitutional:       Appearance: Normal appearance. He is not ill-appearing.   HENT:      Mouth/Throat:      Mouth: Mucous membranes are  moist.   Eyes:      General: No scleral icterus.     Extraocular Movements: Extraocular movements intact.   Cardiovascular:      Rate and Rhythm: Normal rate and regular rhythm.      Pulses: Normal pulses.      Heart sounds: Normal heart sounds. No murmur heard.  Pulmonary:      Effort: Pulmonary effort is normal. No respiratory distress.      Breath sounds: Normal breath sounds. No wheezing.   Abdominal:      General: Bowel sounds are normal.      Palpations: Abdomen is soft.   Musculoskeletal:         General: Normal range of motion.      Cervical back: Normal range of motion and neck supple.   Skin:     General: Skin is warm and dry.   Neurological:      General: No focal deficit present.      Mental Status: He is alert. He is disoriented.            Additional Data:     Labs:  Results from last 7 days   Lab Units 09/02/24  0605   WBC Thousand/uL 9.58   HEMOGLOBIN g/dL 13.8   HEMATOCRIT % 41.2   PLATELETS Thousands/uL 358     Results from last 7 days   Lab Units 09/02/24  0605   SODIUM mmol/L 137   POTASSIUM mmol/L 4.0   CHLORIDE mmol/L 101   CO2 mmol/L 27   BUN mg/dL 14   CREATININE mg/dL 0.97   ANION GAP mmol/L 9   CALCIUM mg/dL 8.6   GLUCOSE RANDOM mg/dL 89                       Lines/Drains:  Invasive Devices       None                         Imaging: No pertinent imaging reviewed.    Recent Cultures (last 7 days):         Last 24 Hours Medication List:   Current Facility-Administered Medications   Medication Dose Route Frequency Provider Last Rate    aspirin  81 mg Oral Daily Jesse Canseco DO      atorvastatin  40 mg Oral QPM Jesse Canseco DO      vitamin B-12  1,000 mcg Oral Daily Adam Severino DO      cyanocobalamin  1,000 mcg Intramuscular Weekly Adam Severino DO      enoxaparin  40 mg Subcutaneous Q24H [facility] Richar Vidal DO      OLANZapine  5 mg Intramuscular Q6H PRN Jesse Canseco DO      senna-docusate sodium  1 tablet Oral BID Richar Vidal DO          Today, Patient Was Seen By: Richar Vidal  DO    **Please Note: This note may have been constructed using a voice recognition system.**

## 2024-09-08 NOTE — ASSESSMENT & PLAN NOTE
Follows with [facility] and Neurologist Dr. Peters  Step down progression with most recent stroke. Worse short term memory per family  Spouse reports that over the past several months patient has been more forgetful with regards to medications and not able   Likely vascular dementia

## 2024-09-09 LAB
ANION GAP SERPL CALCULATED.3IONS-SCNC: 10 MMOL/L (ref 4–13)
BUN SERPL-MCNC: 17 MG/DL (ref 5–25)
CALCIUM SERPL-MCNC: 9.1 MG/DL (ref 8.4–10.2)
CHLORIDE SERPL-SCNC: 100 MMOL/L (ref 96–108)
CO2 SERPL-SCNC: 27 MMOL/L (ref 21–32)
CREAT SERPL-MCNC: 1.09 MG/DL (ref 0.6–1.3)
GFR SERPL CREATININE-BSD FRML MDRD: 65 ML/MIN/1.73SQ M
GLUCOSE SERPL-MCNC: 93 MG/DL (ref 65–140)
MAGNESIUM SERPL-MCNC: 2.3 MG/DL (ref 1.9–2.7)
POTASSIUM SERPL-SCNC: 4.4 MMOL/L (ref 3.5–5.3)
SODIUM SERPL-SCNC: 137 MMOL/L (ref 135–147)

## 2024-09-09 PROCEDURE — 99232 SBSQ HOSP IP/OBS MODERATE 35: CPT | Performed by: INTERNAL MEDICINE

## 2024-09-09 PROCEDURE — 80048 BASIC METABOLIC PNL TOTAL CA: CPT | Performed by: INTERNAL MEDICINE

## 2024-09-09 PROCEDURE — 83735 ASSAY OF MAGNESIUM: CPT | Performed by: INTERNAL MEDICINE

## 2024-09-09 PROCEDURE — 97112 NEUROMUSCULAR REEDUCATION: CPT

## 2024-09-09 PROCEDURE — 97116 GAIT TRAINING THERAPY: CPT

## 2024-09-09 RX ADMIN — ASPIRIN 81 MG CHEWABLE TABLET 81 MG: 81 TABLET CHEWABLE at 10:10

## 2024-09-09 RX ADMIN — ENOXAPARIN SODIUM 40 MG: 40 INJECTION SUBCUTANEOUS at 10:10

## 2024-09-09 RX ADMIN — ATORVASTATIN CALCIUM 40 MG: 40 TABLET, FILM COATED ORAL at 17:49

## 2024-09-09 RX ADMIN — CYANOCOBALAMIN TAB 500 MCG 1000 MCG: 500 TAB at 10:10

## 2024-09-09 RX ADMIN — SENNOSIDES AND DOCUSATE SODIUM 1 TABLET: 50; 8.6 TABLET ORAL at 10:10

## 2024-09-09 NOTE — PHYSICAL THERAPY NOTE
"                                                                                  PHYSICAL THERAPY NOTE          Patient Name: Edy Putnam  Today's Date: 9/9/2024 09/09/24 1128   PT Last Visit   PT Visit Date 09/09/24   Note Type   Note Type Treatment   Pain Assessment   Pain Assessment Tool 0-10   Pain Score No Pain   Restrictions/Precautions   Weight Bearing Precautions Per Order No   Other Precautions Cognitive;Fall Risk;Chair Alarm;Bed Alarm   General   Chart Reviewed Yes   Response to Previous Treatment Patient with no complaints from previous session.   Family/Caregiver Present Yes  (wife)   Cognition   Overall Cognitive Status Impaired   Arousal/Participation Cooperative   Attention Attends with cues to redirect   Orientation Level Oriented to person;Oriented to place;Oriented to time  (year only)   Memory Decreased short term memory  (at baseline)   Following Commands Follows multistep commands with increased time or repetition   Comments very pleasant to work with. cognition appears to be improved compared to IE however wife states he \"waxes and wanes\"   Subjective   Subjective pt states \"It feels good to move\"   Bed Mobility   Supine to Sit Unable to assess   Sit to Supine Unable to assess   Transfers   Sit to Stand 5  Supervision   Additional items Increased time required;Verbal cues   Stand to Sit 5  Supervision   Additional items Increased time required;Verbal cues   Additional Comments c RW vs no AD; x2 STS Throughout   Ambulation/Elevation   Gait pattern Decreased foot clearance;Short stride;Excessively slow   Gait Assistance 5  Supervision   Assistive Device Rolling walker;None  (RW 20' followed by remaineder of session with no AD)   Distance 200'+60'x2+FGA+100'x2   Stair Management Assistance 5  Supervision   Additional items Verbal cues;Increased time required  (close S)   Stair Management Technique One rail L;Alternating pattern;Foreward;Sideways;Nonreciprocal;Reciprocal  (up fwd, " sideways vs fwd down; 1 vs 2 UE use on HR)   Number of Stairs 21  (3 sets of 7 steps)   Ambulation/Elevation Additional Comments educated pt on turning towards HR when descending sideways. gait speed is 0.9m/s which is above fall risk cut off score and acceptable for household and community ambulation. (0.8m/s is community ambulator threshold). FGA score: 22/30 (cut off score for falls <22). main limitations related to HR use with stair negotiation (automatically score 2 and gait speed for activities)   Balance   Static Sitting Fair +   Dynamic Sitting Fair   Static Standing Fair -   Dynamic Standing Fair -   Ambulatory Fair -   Endurance Deficit   Endurance Deficit Yes   Activity Tolerance   Activity Tolerance Patient tolerated treatment well   Medical Staff Made Aware SPT, CM for dc planning   Nurse Made Aware yes   Assessment   Prognosis Good   Problem List Decreased endurance;Impaired balance;Decreased cognition   Assessment Pt agreeable to participate in PT session. Pt performed functional mobility and therex as outlined above. Since IE, pt has improved physically as expected as his dc recommendation was pending progress. Pt has h/o dementia and typically ambulates independently. Wife states pt has STM issues at baseline and states he comes with her back and forth between [place] and [place] to take care of their grandchildren. He has progressed to ambulating household and community distances without AD and no LOB. Fast walking speed 0.9m/s which is above 0.8m/s community ambulation threshold. No LOB throughout session. FGA score 22/30 (cut off score for fall risk <22). Scored low on stair negotiation due to HR use (no unsteadiness), decreased speed of gait to complete activities (no unsteadiness), and had to stop to step over obstacle. Wife very upset regarding time it has taken to get an insurance approval for STR. Explained that CM handles insurance/STR approvals. 30 min spent with pt assessing balance, gait, and  "safety. Educated on increased frequency of mobilization when pt at home vs restricted for mobility at facility while waiting for therapy session. Wife appears overwhelmed with pt coming home however states that he normally comes with her to take care of the grandchildren and states it was a \"waste\" that he was in hospital waiting for insurance authorization when he could have came home this whole time. However also states she does not feel he can come home at this time. Educated wife that pt has received therapy and restorative services while in hospital and has progressed while here. Wife remains upset stating the \"pt and family dont have a say anyways\". Informed wife that it is ultimately pt and family choice for dc plan and PT/OT are only a part of the overall dc planning team. Defer to CM for further dc planning. The patient's AM-PAC Basic Mobility Inpatient Short Form Raw Score is 19. A Raw score of greater than 16 suggests the patient may benefit from discharge to home. Please also refer to the recommendation of the Physical Therapist for safe discharge planning. Pt left seated in chair with chair alarm, call bell, phone, and all personal needs within reach. Pt will continue to benefit from skilled acute care PT to further address their functional mobility limitations.   Barriers to Discharge Decreased caregiver support  (wife is concerned with having take care of her grandchildren and go back and forth between [place] and [place]. states  usually comes with her. wife would like pt to go to [facility] for a few days of continuous rehab.)   Goals   Patient Goals pt would like to walk more   STG Expiration Date 09/13/24   PT Treatment Day 3   Plan   Treatment/Interventions Functional transfer training;LE strengthening/ROM;Therapeutic exercise;Endurance training;Cognitive reorientation;Patient/family training;Elevations;Equipment eval/education;Bed mobility;Gait training;Spoke to case management;Spoke to nursing;OT;Family "   Progress Progressing toward goals   PT Frequency 2-3x/wk   Discharge Recommendation   Rehab Resource Intensity Level, PT II (Moderate Resource Intensity)  (per family request)   Additional Comments (S)  family would like pt to still go to [facility] for a few days of continous therapy. explained to wife that insurance company will have to approve rehab stay as did CM per our conversation. Wife is ultimately unhappy with untimely insurance barrier. If insurance does not approve rehab stay and wife does not feel comfortable having pt with her, recommend respite care. believe pt can benefit from neuro focused OPPT.   AM-PAC Basic Mobility Inpatient   Turning in Flat Bed Without Bedrails 4   Lying on Back to Sitting on Edge of Flat Bed Without Bedrails 3   Moving Bed to Chair 3   Standing Up From Chair Using Arms 3   Walk in Room 3   Climb 3-5 Stairs With Railing 3   Basic Mobility Inpatient Raw Score 19   Basic Mobility Standardized Score 42.48   [facility] Highest Level Of Mobility   -HLM Goal 6: Walk 10 steps or more   JH-HLM Achieved 8: Walk 250 feet ot more   Cody Anderson, PT, DPT, NCS

## 2024-09-09 NOTE — PLAN OF CARE
Problem: PAIN - ADULT  Goal: Verbalizes/displays adequate comfort level or baseline comfort level  Description: Interventions:  - Encourage patient to monitor pain and request assistance  - Assess pain using appropriate pain scale  - Administer analgesics based on type and severity of pain and evaluate response  - Implement non-pharmacological measures as appropriate and evaluate response  - Notify physician/advanced practitioner if interventions unsuccessful or patient reports new pain  9/8/2024 2316 by Tami Farfan RN  Outcome: Progressing    Problem: INFECTION - ADULT  Goal: Absence or prevention of progression during hospitalization  Description: INTERVENTIONS:  - Assess and monitor for signs and symptoms of infection  - Monitor lab/diagnostic results  - Monitor all insertion sites, i.e. indwelling lines, tubes, and drains  - [place] appropriate cooling/warming therapies per order  - Administer medications as ordered  - Instruct and encourage patient and family to use good hand hygiene technique  - Identify and instruct in appropriate isolation precautions for identified infection/condition  9/8/2024 2316 by Tami Farfan RN  Outcome: Progressing    Problem: SAFETY ADULT  Goal: Patient will remain free of falls  Description: INTERVENTIONS:  - Educate patient/family on patient safety including physical limitations  - Instruct patient to call for assistance with activity   - Consult OT/PT to assist with strengthening/mobility   - Keep Call bell within reach  - Keep bed low and locked with side rails adjusted as appropriate  - Keep care items and personal belongings within reach  - Initiate and maintain comfort rounds  - Make Fall Risk Sign visible to staff  - Offer Toileting every 2 Hours, in advance of need  - Initiate/Maintain bed/chair alarm  - Obtain necessary fall risk management equipment.  - Apply yellow socks and bracelet for high fall risk patients  - Consider moving patient to room near nurses  station  9/8/2024 2316 by Tami Farfan RN  Outcome: Progressing    Goal: Maintain or return to baseline ADL function  Description: INTERVENTIONS:  -  Assess patient's ability to carry out ADLs; assess patient's baseline for ADL function and identify physical deficits which impact ability to perform ADLs (bathing, care of mouth/teeth, toileting, grooming, dressing, etc.)  - Assess/evaluate cause of self-care deficits   - Assess range of motion  - Assess patient's mobility; develop plan if impaired  - Assess patient's need for assistive devices and provide as appropriate  - Encourage maximum independence but intervene and supervise when necessary  - Involve family in performance of ADLs  - Assess for home care needs following discharge   - Consider OT consult to assist with ADL evaluation and planning for discharge  - Provide patient education as appropriate  9/8/2024 2316 by Tami Farfan RN  Outcome: Progressing    Goal: Maintains/Returns to pre admission functional level  Description: INTERVENTIONS:  - Perform AM-PAC 6 Click Basic Mobility/ Daily Activity assessment daily.  - Set and communicate daily mobility goal to care team and patient/family/caregiver.   - Collaborate with rehabilitation services on mobility goals if consulted  - Dangle patient 3 times a day  - Stand patient 3 times a day  - Ambulate patient 3 times a day  - Out of bed to chair 3 times a day   - Out of bed for meals 3 times a day  - Out of bed for toileting  - Record patient progress and toleration of activity level   9/8/2024 2316 by Tami Farfan RN  Outcome: Progressing      Problem: DISCHARGE PLANNING  Goal: Discharge to home or other facility with appropriate resources  Description: INTERVENTIONS:  - Identify barriers to discharge w/patient and caregiver  - Arrange for needed discharge resources and transportation as appropriate  - Identify discharge learning needs (meds, wound care, etc.)  - Refer to Case Management Department  for coordinating discharge planning if the patient needs post-hospital services based on physician/advanced practitioner order or complex needs related to functional status, cognitive ability, or social support system  9/8/2024 2316 by Tami Farfan RN  Outcome: Progressing    Problem: Knowledge Deficit  Goal: Patient/family/caregiver demonstrates understanding of disease process, treatment plan, medications, and discharge instructions  Description: Complete learning assessment and assess knowledge base.  Interventions:  - Provide teaching at level of understanding  - Provide teaching via preferred learning methods  9/8/2024 2316 by Tami Farfan RN  Outcome: Progressing      Problem: NEUROSENSORY - ADULT  Goal: Achieves stable or improved neurological status  Description: INTERVENTIONS  - Monitor and report changes in neurological status  - Monitor vital signs such as temperature, blood pressure, glucose, and any other labs ordered   - Initiate measures to prevent increased intracranial pressure  - Monitor for seizure activity and implement precautions if appropriate      9/8/2024 2316 by Tami Farfan RN  Outcome: Progressing    Problem: CARDIOVASCULAR - ADULT  Goal: Maintains optimal cardiac output and hemodynamic stability  Description: INTERVENTIONS:  - Monitor I/O, vital signs and rhythm  - Monitor for S/S and trends of decreased cardiac output  - Administer and titrate ordered vasoactive medications to optimize hemodynamic stability  - Assess quality of pulses, skin color and temperature  - Assess for signs of decreased coronary artery perfusion  - Instruct patient to report change in severity of symptoms  9/8/2024 2316 by Tami Farfan RN  Outcome: Progressing    Goal: Absence of cardiac dysrhythmias or at baseline rhythm  Description: INTERVENTIONS:  - Continuous cardiac monitoring, vital signs, obtain 12 lead EKG if ordered  - Administer antiarrhythmic and heart rate control medications as  ordered  - Monitor electrolytes and administer replacement therapy as ordered  9/8/2024 2316 by Tami Farfan RN  Outcome: Progressing       Goal: Achieves maximal functionality and self care  Description: INTERVENTIONS  - Monitor swallowing and airway patency with patient fatigue and changes in neurological status  - Encourage and assist patient to increase activity and self care.   - Encourage visually impaired, hearing impaired and aphasic patients to use assistive/communication devices  9/8/2024 2316 by Tami Farfan RN  Outcome: Progressing    Problem: CARDIOVASCULAR - ADULT  Goal: Maintains optimal cardiac output and hemodynamic stability  Description: INTERVENTIONS:  - Monitor I/O, vital signs and rhythm  - Monitor for S/S and trends of decreased cardiac output  - Administer and titrate ordered vasoactive medications to optimize hemodynamic stability  - Assess quality of pulses, skin color and temperature  - Assess for signs of decreased coronary artery perfusion  - Instruct patient to report change in severity of symptoms  9/8/2024 2316 by Tami Farfan RN  Outcome: Progressing    Goal: Absence of cardiac dysrhythmias or at baseline rhythm  Description: INTERVENTIONS:  - Continuous cardiac monitoring, vital signs, obtain 12 lead EKG if ordered  - Administer antiarrhythmic and heart rate control medications as ordered  - Monitor electrolytes and administer replacement therapy as ordered  9/8/2024 2316 by Tami Farfan RN  Outcome: Progressing    Problem: Prexisting or High Potential for Compromised Skin Integrity  Goal: Skin integrity is maintained or improved  Description: INTERVENTIONS:  - Identify patients at risk for skin breakdown  - Assess and monitor skin integrity  - Assess and monitor nutrition and hydration status  - Monitor labs   - Assess for incontinence   - Turn and reposition patient  - Assist with mobility/ambulation  - Relieve pressure over bony prominences  - Avoid friction and  shearing  - Provide appropriate hygiene as needed including keeping skin clean and dry  - Evaluate need for skin moisturizer/barrier cream  - Collaborate with interdisciplinary team   - Patient/family teaching  - Consider wound care consult   9/8/2024 2316 by Tami Farfan RN  Outcome: Progressing    Problem: Neurological Deficit  Goal: Neurological status is stable or improving  Description: Interventions:  - Monitor and assess patient's level of consciousness, motor function, sensory function, and level of assistance needed for ADLs.   - Monitor and report changes from baseline. Collaborate with interdisciplinary team to initiate plan and implement interventions as ordered.   - Provide and maintain a safe environment.  - Consider seizure precautions.  - Consider fall precautions.  - Consider aspiration precautions.  - Consider bleeding precautions.  9/8/2024 2316 by Tami Farfan RN  Outcome: Progressing

## 2024-09-09 NOTE — CASE MANAGEMENT
Called Nurse tiffany P:[phone] [facility]  to cecilio fax was received with clinicals for SNF auth request . Left message asking for call back stated pt is ready to go. Campbell notified teresa roper

## 2024-09-09 NOTE — CASE MANAGEMENT
Received call from Nurse allen who stated that the clincals were received and they were sent to Paulino Petit he's a  for the clinic . Moustapha P:[phone] . The dr and nurse allen have alerted him and he is the one who needs to facilitate the request .   Called paulino petit and left VM for him to return call to start [facility]. Cm notified teresa      also left a VM for nurse allen asking if she can verify moustapha phone number because when the number is called it just goes to a voicemail that says record message now . Also wanted to clarify if anything else was needed on our end or if we are just waiting to hear back from paulino

## 2024-09-09 NOTE — RESTORATIVE TECHNICIAN NOTE
Restorative Technician Note      Patient Name: Edy Putnam     Note Type: Mobility (Pt refused ambulation nursing aware)    Amandeep May BS, Restorative Technician,

## 2024-09-09 NOTE — PLAN OF CARE
Problem: PHYSICAL THERAPY ADULT  Goal: Performs mobility at highest level of function for planned discharge setting.  See evaluation for individualized goals.  Description: Treatment/Interventions: Functional transfer training, LE strengthening/ROM, Elevations, Therapeutic exercise, Endurance training, Cognitive reorientation, Patient/family training, Equipment eval/education, Bed mobility, Gait training, Spoke to nursing, OT  Equipment Recommended: Other (Comment) (TBD)       See flowsheet documentation for full assessment, interventions and recommendations.  9/9/2024 1244 by Cody Anderson PT  Outcome: Progressing  Note: Prognosis: Good  Problem List: Decreased endurance, Impaired balance, Decreased cognition  Assessment: Pt agreeable to participate in PT session. Pt performed functional mobility and therex as outlined above. Since IE, pt has improved physically as expected as his dc recommendation was pending progress. Pt has h/o dementia and typically ambulates independently. Wife states pt has STM issues at baseline and states he comes with her back and forth between [place] and [place] to take care of their grandchildren. He has progressed to ambulating household and community distances without AD and no LOB. Fast walking speed 0.9m/s which is above 0.8m/s community ambulation threshold. No LOB throughout session. FGA score 22/30 (cut off score for fall risk <22). Scored low on stair negotiation due to HR use (no unsteadiness), decreased speed of gait to complete activities (no unsteadiness), and had to stop to step over obstacle. Wife very upset regarding time it has taken to get an insurance approval for STR. Explained that CM handles insurance/STR approvals. 30 min spent with pt assessing balance, gait, and safety. Educated on increased frequency of mobilization when pt at home vs restricted for mobility at facility while waiting for therapy session. Wife appears overwhelmed with pt coming home however states  "that he normally comes with her to take care of the grandchildren and states it was a \"waste\" that he was in hospital waiting for insurance authorization when he could have came home this whole time. However also states she does not feel he can come home at this time. Educated wife that pt has received therapy and restorative services while in hospital and has progressed while here. Wife remains upset stating the \"pt and family dont have a say anyways\". Informed wife that it is ultimately pt and family choice for dc plan and PT/OT are only a part of the overall dc planning team. Defer to CM for further dc planning. The patient's AM-PAC Basic Mobility Inpatient Short Form Raw Score is 19. A Raw score of greater than 16 suggests the patient may benefit from discharge to home. Please also refer to the recommendation of the Physical Therapist for safe discharge planning. Pt left seated in chair with chair alarm, call bell, phone, and all personal needs within reach. Pt will continue to benefit from skilled acute care PT to further address their functional mobility limitations.  Barriers to Discharge: Decreased caregiver support (wife is concerned with having take care of her grandchildren and go back and forth between [place] and [place]. states  usually comes with her. wife would like pt to go to [facility] for a few days of continuous rehab.)     Rehab Resource Intensity Level, PT: II (Moderate Resource Intensity) (per family request)    See flowsheet documentation for full assessment.        "

## 2024-09-09 NOTE — CASE MANAGEMENT
Case Management Discharge Planning Note    Patient name Edy Putnam  Location [facility] 705/[facility] 705-01 MRN [id number]  : 1945 Date 2024       Current Admission Date: 2024  Current Admission Diagnosis:Acute stroke due to ischemia (HCC)   Patient Active Problem List    Diagnosis Date Noted Date Diagnosed    B12 deficiency 2024     Pneumococcal pneumonia (HCC) 2024     S/P splenectomy 2024     Acute stroke due to ischemia (HCC) 2024     History of stroke 2024     Dementia (HCC) 2024       LOS (days): 10  Geometric Mean LOS (GMLOS) (days):   Days to GMLOS:     OBJECTIVE:  Risk of Unplanned Readmission Score: 10.66         Current admission status: Inpatient   Preferred Pharmacy:   PATIENT/FAMILY REPORTS NO PREFERRED PHARMACY  No address on file      Primary Care Provider: No primary care provider on file.    [facility] Insurance[facility]  Secondary Insurance: MEDICARE    DISCHARGE DETAILS:         Additional Comments: Tylor informed that insurance auth. is still pending.  This CM attempted to call [facility]- on hold for more than 30 minutes without being able to leave a message. CM also left message with [facility] to see if [facility] them- no answer.

## 2024-09-09 NOTE — PROGRESS NOTES
"[facility]  Progress Note  Name: Edy Putnam I  MRN: [id number]  Unit/Bed#: [facility] 705-01 I Date of Admission: 8/29/2024   Date of Service: 9/9/2024 I Hospital Day: 10    Assessment & Plan   * Acute stroke due to ischemia (HCC)  Assessment & Plan  Pt presents from home for evaluation of AMS starting on  8/29.  No focal neurological deficits.   Baseline:patient able to do all chores around the house . Able to drive, cook, clean himself, pick out clothes  CTA head/ neck with no evidence of intercranial hemorrhage, Severe chronic microvascular ischemic change and superimposed chronic infarcts as detailed above. Age-indeterminate right temporal infarct, likely chronic.   EKG sinus without acute ischemic changes  CXR normal  MRI 8/31: Acute infarct involving the right temporal lobe with petechial hemorrhage.  -Small area of acute infarct within the left parietal periventricular white matter.  No clot on echo, tele was normal sinus no events  Placed on ASA and high intensity statin  Loop recorder/zio patch as op  Fall and delirium precautions  Continue PT OT  Medically clear for discharge,  is following  Awaiting rehab placement    History of stroke  Assessment & Plan  Wife reports a history of a \"Watershed\" CVA in 2015 and patient has had short term memory deficits since then   Patient is reportedly on aspirin as well as \"simvastatin\" but wife unsure of medication doses  Aspirin, High Intensity statin     Dementia (HCC)  Assessment & Plan  Follows with [facility] and Neurologist Dr. Peters  Step down progression with most recent stroke. Worse short term memory per family  Spouse reports that over the past several months patient has been more forgetful with regards to medications and not able   Likely vascular dementia    B12 deficiency  Assessment & Plan  Continue with B12 supplement             VTE Pharmacologic Prophylaxis: VTE Score: 9 High Risk (Score >/= 5) - " Pharmacological DVT Prophylaxis Ordered: enoxaparin (Lovenox). Sequential Compression Devices Ordered.    Mobility:   Basic Mobility Inpatient Raw Score: 19  JH-HLM Goal: 6: Walk 10 steps or more  JH-HLM Achieved: 8: Walk 250 feet ot more  JH-HLM Goal achieved. Continue to encourage appropriate mobility.    Patient Centered Rounds: I performed bedside rounds with nursing staff today.   Discussions with Specialists or Other Care Team Provider: KIMMIE    Education and Discussions with Family / Patient:  Patient.     Total Time Spent on Date of Encounter in care of patient: 30 mins. This time was spent on one or more of the following: performing physical exam; counseling and coordination of care; obtaining or reviewing history; documenting in the medical record; reviewing/ordering tests, medications or procedures; communicating with other healthcare professionals and discussing with patient's family/caregivers.    Current Length of Stay: 10 day(s)  Current Patient Status: Inpatient   Certification Statement: The patient will continue to require additional inpatient hospital stay due to awaiting rehab placement      Code Status: Level 3 - DNAR and DNI    Subjective:   Patient is comfortable in bed  No event overnight  No complaints    Objective:     Vitals:   Temp (24hrs), Av.6 °F (37 °C), Min:98.3 °F (36.8 °C), Max:98.9 °F (37.2 °C)    Temp:  [98.3 °F (36.8 °C)-98.9 °F (37.2 °C)] 98.5 °F (36.9 °C)  HR:  [70-71] 71  Resp:  [15-17] 17  BP: (125-128)/(76-78) 127/76  SpO2:  [94 %-95 %] 94 %  Body mass index is 24.53 kg/m².     Input and Output Summary (last 24 hours):   No intake or output data in the 24 hours ending 24 1402    Physical Exam:   Physical Exam  Vitals reviewed.   Constitutional:       Appearance: Normal appearance. He is not ill-appearing.   Cardiovascular:      Rate and Rhythm: Normal rate and regular rhythm.      Pulses: Normal pulses.      Heart sounds: Normal heart sounds. No murmur  heard.  Pulmonary:      Effort: Pulmonary effort is normal. No respiratory distress.      Breath sounds: Normal breath sounds. No wheezing.   Abdominal:      General: Bowel sounds are normal. There is no distension.      Palpations: Abdomen is soft.      Tenderness: There is no abdominal tenderness.   Skin:     General: Skin is warm and dry.   Neurological:      General: No focal deficit present.      Mental Status: He is alert. He is disoriented.   Psychiatric:         Mood and Affect: Mood normal.            Additional Data:     Labs:      Results from last 7 days   Lab Units 09/09/24  1021   SODIUM mmol/L 137   POTASSIUM mmol/L 4.4   CHLORIDE mmol/L 100   CO2 mmol/L 27   BUN mg/dL 17   CREATININE mg/dL 1.09   ANION GAP mmol/L 10   CALCIUM mg/dL 9.1   GLUCOSE RANDOM mg/dL 93                       Lines/Drains:  Invasive Devices       None                         Imaging: No pertinent imaging reviewed.    Recent Cultures (last 7 days):         Last 24 Hours Medication List:   Current Facility-Administered Medications   Medication Dose Route Frequency Provider Last Rate    aspirin  81 mg Oral Daily Jesse Canseco DO      atorvastatin  40 mg Oral QPM Jesse Canseco DO      vitamin B-12  1,000 mcg Oral Daily Adam Severino DO      enoxaparin  40 mg Subcutaneous Q24H [facility] Richar Vidal DO      OLANZapine  5 mg Intramuscular Q6H PRN Jesse Canseco DO      senna-docusate sodium  1 tablet Oral BID Richar Vidal DO          Today, Patient Was Seen By: Richar Vidal DO    **Please Note: This note may have been constructed using a voice recognition system.**

## 2024-09-09 NOTE — PLAN OF CARE
Problem: SAFETY ADULT  Goal: Patient will remain free of falls  Description: INTERVENTIONS:  - Educate patient/family on patient safety including physical limitations  - Instruct patient to call for assistance with activity   - Consult OT/PT to assist with strengthening/mobility   - Keep Call bell within reach  - Keep bed low and locked with side rails adjusted as appropriate  - Keep care items and personal belongings within reach  - Initiate and maintain comfort rounds  - Make Fall Risk Sign visible to staff  - Offer Toileting every 2 Hours, in advance of need  - Initiate/Maintain bed/chair alarm  - Obtain necessary fall risk management equipment.  - Apply yellow socks and bracelet for high fall risk patients  - Consider moving patient to room near nurses station  Outcome: Progressing  Goal: Maintain or return to baseline ADL function  Description: INTERVENTIONS:  -  Assess patient's ability to carry out ADLs; assess patient's baseline for ADL function and identify physical deficits which impact ability to perform ADLs (bathing, care of mouth/teeth, toileting, grooming, dressing, etc.)  - Assess/evaluate cause of self-care deficits   - Assess range of motion  - Assess patient's mobility; develop plan if impaired  - Assess patient's need for assistive devices and provide as appropriate  - Encourage maximum independence but intervene and supervise when necessary  - Involve family in performance of ADLs  - Assess for home care needs following discharge   - Consider OT consult to assist with ADL evaluation and planning for discharge  - Provide patient education as appropriate  Outcome: Progressing  Goal: Maintains/Returns to pre admission functional level  Description: INTERVENTIONS:  - Perform AM-PAC 6 Click Basic Mobility/ Daily Activity assessment daily.  - Set and communicate daily mobility goal to care team and patient/family/caregiver.   - Collaborate with rehabilitation services on mobility goals if  consulted  - Dangle patient 3 times a day  - Stand patient 3 times a day  - Ambulate patient 3 times a day  - Out of bed to chair 3 times a day   - Out of bed for meals 3 times a day  - Out of bed for toileting  - Record patient progress and toleration of activity level   Outcome: Progressing     Problem: NEUROSENSORY - ADULT  Goal: Achieves stable or improved neurological status  Description: INTERVENTIONS  - Monitor and report changes in neurological status  - Monitor vital signs such as temperature, blood pressure, glucose, and any other labs ordered   - Initiate measures to prevent increased intracranial pressure  - Monitor for seizure activity and implement precautions if appropriate      Outcome: Progressing  Goal: Achieves maximal functionality and self care  Description: INTERVENTIONS  - Monitor swallowing and airway patency with patient fatigue and changes in neurological status  - Encourage and assist patient to increase activity and self care.   - Encourage visually impaired, hearing impaired and aphasic patients to use assistive/communication devices  Outcome: Progressing     Problem: Prexisting or High Potential for Compromised Skin Integrity  Goal: Skin integrity is maintained or improved  Description: INTERVENTIONS:  - Identify patients at risk for skin breakdown  - Assess and monitor skin integrity  - Assess and monitor nutrition and hydration status  - Monitor labs   - Assess for incontinence   - Turn and reposition patient  - Assist with mobility/ambulation  - Relieve pressure over bony prominences  - Avoid friction and shearing  - Provide appropriate hygiene as needed including keeping skin clean and dry  - Evaluate need for skin moisturizer/barrier cream  - Collaborate with interdisciplinary team   - Patient/family teaching  - Consider wound care consult   Outcome: Progressing     Problem: Neurological Deficit  Goal: Neurological status is stable or improving  Description: Interventions:  -  Monitor and assess patient's level of consciousness, motor function, sensory function, and level of assistance needed for ADLs.   - Monitor and report changes from baseline. Collaborate with interdisciplinary team to initiate plan and implement interventions as ordered.   - Provide and maintain a safe environment.  - Consider seizure precautions.  - Consider fall precautions.  - Consider aspiration precautions.  - Consider bleeding precautions.  Outcome: Progressing

## 2024-09-09 NOTE — ASSESSMENT & PLAN NOTE
Pt presents from home for evaluation of AMS starting on  8/29.  No focal neurological deficits.   Baseline:patient able to do all chores around the house . Able to drive, cook, clean himself, pick out clothes  CTA head/ neck with no evidence of intercranial hemorrhage, Severe chronic microvascular ischemic change and superimposed chronic infarcts as detailed above. Age-indeterminate right temporal infarct, likely chronic.   EKG sinus without acute ischemic changes  CXR normal  MRI 8/31: Acute infarct involving the right temporal lobe with petechial hemorrhage.  -Small area of acute infarct within the left parietal periventricular white matter.  No clot on echo, tele was normal sinus no events  Placed on ASA and high intensity statin  Loop recorder/zio patch as op  Fall and delirium precautions  Continue PT OT  Medically clear for discharge,  is following  Awaiting rehab placement

## 2024-09-09 NOTE — ASSESSMENT & PLAN NOTE
Follows with [facility] and Neurologist Dr. Peters  Step down progression with most recent stroke. Worse short term memory per family  Spouse reports that over the past several months patient has been more forgetful with regards to medications and not able   Likely vascular dementia

## 2024-09-10 PROCEDURE — 97535 SELF CARE MNGMENT TRAINING: CPT

## 2024-09-10 PROCEDURE — 99232 SBSQ HOSP IP/OBS MODERATE 35: CPT | Performed by: INTERNAL MEDICINE

## 2024-09-10 PROCEDURE — 97129 THER IVNTJ 1ST 15 MIN: CPT

## 2024-09-10 PROCEDURE — 97130 THER IVNTJ EA ADDL 15 MIN: CPT

## 2024-09-10 RX ADMIN — CYANOCOBALAMIN TAB 500 MCG 1000 MCG: 500 TAB at 07:53

## 2024-09-10 RX ADMIN — SENNOSIDES AND DOCUSATE SODIUM 1 TABLET: 50; 8.6 TABLET ORAL at 07:53

## 2024-09-10 RX ADMIN — ENOXAPARIN SODIUM 40 MG: 40 INJECTION SUBCUTANEOUS at 07:53

## 2024-09-10 RX ADMIN — SENNOSIDES AND DOCUSATE SODIUM 1 TABLET: 50; 8.6 TABLET ORAL at 17:07

## 2024-09-10 RX ADMIN — ASPIRIN 81 MG CHEWABLE TABLET 81 MG: 81 TABLET CHEWABLE at 07:53

## 2024-09-10 RX ADMIN — ATORVASTATIN CALCIUM 40 MG: 40 TABLET, FILM COATED ORAL at 17:07

## 2024-09-10 NOTE — CASE MANAGEMENT
Case Management Discharge Planning Note    Patient name Edy Putnam  Location [facility] 705/[facility] 705-01 MRN [id number]  : 1945 Date 9/10/2024       Current Admission Date: 2024  Current Admission Diagnosis:Acute stroke due to ischemia (HCC)   Patient Active Problem List    Diagnosis Date Noted Date Diagnosed    B12 deficiency 2024     Pneumococcal pneumonia (HCC) 2024     S/P splenectomy 2024     Acute stroke due to ischemia ([facility]) 2024     History of stroke 2024     Dementia ([facility]) 2024       LOS (days): 11  Geometric Mean LOS (GMLOS) (days):   Days to GMLOS:     OBJECTIVE:  Risk of Unplanned Readmission Score: 10.83         Current admission status: Inpatient   Preferred Pharmacy:   PATIENT/FAMILY REPORTS NO PREFERRED PHARMACY  No address on file      Primary Care Provider: No primary care provider on file.    Primary Insurance: [facility]  Secondary Insurance: MEDICARE    DISCHARGE DETAILS:       Other Referral/Resources/Interventions Provided:  Referral Comments: No bed availability today - for transfer to [facility] tomorrow

## 2024-09-10 NOTE — PROGRESS NOTES
"Progress Note - Hospitalist   Name: Edy Putnam 79 y.o. male I MRN: [id number]  Unit/Bed#: [facility] 705-01 I Date of Admission: 8/29/2024   Date of Service: 9/10/2024 I Hospital Day: 11    Assessment & Plan  Acute stroke due to ischemia (HCC)  Pt presents from home for evaluation of AMS starting on  8/29.  No focal neurological deficits.   Baseline:patient able to do all chores around the house . Able to drive, cook, clean himself, pick out clothes  CTA head/ neck with no evidence of intercranial hemorrhage, Severe chronic microvascular ischemic change and superimposed chronic infarcts as detailed above. Age-indeterminate right temporal infarct, likely chronic.   EKG sinus without acute ischemic changes  CXR normal  MRI 8/31: Acute infarct involving the right temporal lobe with petechial hemorrhage.  -Small area of acute infarct within the left parietal periventricular white matter.  No clot on echo, tele was normal sinus no events  Placed on ASA and high intensity statin  Loop recorder/zio patch as op  Fall and delirium precautions  Continue PT OT  Medically clear for discharge,  is following  Awaiting rehab placement  History of stroke  Wife reports a history of a \"Watershed\" CVA in 2015 and patient has had short term memory deficits since then   Patient is reportedly on aspirin as well as \"simvastatin\" but wife unsure of medication doses  Aspirin, High Intensity statin   Dementia (HCC)  Follows with [facility] and Neurologist Dr. Peters  Step down progression with most recent stroke. Worse short term memory per family  Spouse reports that over the past several months patient has been more forgetful with regards to medications and not able   Likely vascular dementia  Pneumococcal pneumonia (HCC)  H/o Pneumococcal Pneumonia in the late 1980's   Patient had a splenectomy in the past  S/P splenectomy  Prev splenectomy  B12 deficiency  Continue with B12 supplement    VTE Pharmacologic Prophylaxis: VTE " Score: 9 High Risk (Score >/= 5) - Pharmacological DVT Prophylaxis Ordered: heparin. Sequential Compression Devices Ordered.    Mobility:   Basic Mobility Inpatient Raw Score: 19  JH-HLM Goal: 6: Walk 10 steps or more  JH-HLM Achieved: 6: Walk 10 steps or more  JH-HLM Goal achieved. Continue to encourage appropriate mobility.    Patient Centered Rounds: I performed bedside rounds with nursing staff today.   Discussions with Specialists or Other Care Team Provider: nurse, CM      Current Length of Stay: 11 day(s)  Current Patient Status: Inpatient   Certification Statement: The patient will continue to require additional inpatient hospital stay due to discharge planning  Discharge Plan: Anticipate discharge tomorrow to rehab facility.    Code Status: Level 3 - DNAR and DNI    Subjective   Denies any new complaints.    Objective     Vitals:   Temp (24hrs), Av.8 °F (36.6 °C), Min:97.6 °F (36.4 °C), Max:97.9 °F (36.6 °C)    Temp:  [97.6 °F (36.4 °C)-97.9 °F (36.6 °C)] 97.6 °F (36.4 °C)  HR:  [72-77] 72  Resp:  [17] 17  BP: ()/(75-78) 99/75  SpO2:  [95 %-97 %] 97 %  Body mass index is 24.53 kg/m².     Input and Output Summary (last 24 hours):     Intake/Output Summary (Last 24 hours) at 9/10/2024 1527  Last data filed at 9/10/2024 0501  Gross per 24 hour   Intake 240 ml   Output --   Net 240 ml       Physical Exam  Constitutional:       Appearance: Normal appearance.   HENT:      Head: Normocephalic and atraumatic.      Nose: Nose normal.      Mouth/Throat:      Mouth: Mucous membranes are moist.      Pharynx: Oropharynx is clear.   Eyes:      Extraocular Movements: Extraocular movements intact.   Cardiovascular:      Rate and Rhythm: Normal rate and regular rhythm.   Pulmonary:      Effort: Pulmonary effort is normal.      Breath sounds: No wheezing or rales.   Neurological:      Mental Status: He is alert. Mental status is at baseline.      Cranial Nerves: No cranial nerve deficit.      Motor: No weakness.    Psychiatric:         Mood and Affect: Mood normal.         Behavior: Behavior normal.          Lines/Drains:  Lines/Drains/Airways       Active Status       None                            Lab Results: I have reviewed the following results: none       Results from last 7 days   Lab Units 09/09/24  1021   SODIUM mmol/L 137   POTASSIUM mmol/L 4.4   CHLORIDE mmol/L 100   CO2 mmol/L 27   BUN mg/dL 17   CREATININE mg/dL 1.09   ANION GAP mmol/L 10   CALCIUM mg/dL 9.1   GLUCOSE RANDOM mg/dL 93                       Recent Cultures (last 7 days):         Imaging Review: No pertinent imaging studies reviewed.  Other Studies: No additional pertinent studies reviewed.    Last 24 Hours Medication List:     Current Facility-Administered Medications:     aspirin chewable tablet 81 mg, Daily    atorvastatin (LIPITOR) tablet 40 mg, QPM    cyanocobalamin (VITAMIN B-12) tablet 1,000 mcg, Daily    enoxaparin (LOVENOX) subcutaneous injection 40 mg, Q24H JAMAL    OLANZapine (ZyPREXA) IM injection 5 mg, Q6H PRN    senna-docusate sodium (SENOKOT S) 8.6-50 mg per tablet 1 tablet, BID    Administrative Statements   Today, Patient Was Seen By: Cristian Amado MD  I have spent a total time of 25 minutes in caring for this patient on the day of the visit/encounter including Instructions for management, Patient and family education, Counseling / Coordination of care, Documenting in the medical record, and Communicating with other healthcare professionals .    **Please Note: This note may have been constructed using a voice recognition system.**

## 2024-09-10 NOTE — PLAN OF CARE
Problem: PAIN - ADULT  Goal: Verbalizes/displays adequate comfort level or baseline comfort level  Description: Interventions:  - Encourage patient to monitor pain and request assistance  - Assess pain using appropriate pain scale  - Administer analgesics based on type and severity of pain and evaluate response  - Implement non-pharmacological measures as appropriate and evaluate response  - Notify physician/advanced practitioner if interventions unsuccessful or patient reports new pain  Outcome: Progressing     Problem: INFECTION - ADULT  Goal: Absence or prevention of progression during hospitalization  Description: INTERVENTIONS:  - Assess and monitor for signs and symptoms of infection  - Monitor lab/diagnostic results  - Monitor all insertion sites, i.e. indwelling lines, tubes, and drains  - [place] appropriate cooling/warming therapies per order  - Administer medications as ordered  - Instruct and encourage patient and family to use good hand hygiene technique  - Identify and instruct in appropriate isolation precautions for identified infection/condition  Outcome: Progressing     Problem: SAFETY ADULT  Goal: Patient will remain free of falls  Description: INTERVENTIONS:  - Educate patient/family on patient safety including physical limitations  - Instruct patient to call for assistance with activity   - Consult OT/PT to assist with strengthening/mobility   - Keep Call bell within reach  - Keep bed low and locked with side rails adjusted as appropriate  - Keep care items and personal belongings within reach  - Initiate and maintain comfort rounds  - Make Fall Risk Sign visible to staff  - Offer Toileting every 2 Hours, in advance of need  - Initiate/Maintain bed/chair alarm  - Obtain necessary fall risk management equipment.  - Apply yellow socks and bracelet for high fall risk patients  - Consider moving patient to room near nurses station  Outcome: Progressing  Goal: Maintain or return to baseline ADL  function  Description: INTERVENTIONS:  -  Assess patient's ability to carry out ADLs; assess patient's baseline for ADL function and identify physical deficits which impact ability to perform ADLs (bathing, care of mouth/teeth, toileting, grooming, dressing, etc.)  - Assess/evaluate cause of self-care deficits   - Assess range of motion  - Assess patient's mobility; develop plan if impaired  - Assess patient's need for assistive devices and provide as appropriate  - Encourage maximum independence but intervene and supervise when necessary  - Involve family in performance of ADLs  - Assess for home care needs following discharge   - Consider OT consult to assist with ADL evaluation and planning for discharge  - Provide patient education as appropriate  Outcome: Progressing  Goal: Maintains/Returns to pre admission functional level  Description: INTERVENTIONS:  - Perform AM-PAC 6 Click Basic Mobility/ Daily Activity assessment daily.  - Set and communicate daily mobility goal to care team and patient/family/caregiver.   - Collaborate with rehabilitation services on mobility goals if consulted  - Dangle patient 3 times a day  - Stand patient 3 times a day  - Ambulate patient 3 times a day  - Out of bed to chair 3 times a day   - Out of bed for meals 3 times a day  - Out of bed for toileting  - Record patient progress and toleration of activity level   Outcome: Progressing     Problem: DISCHARGE PLANNING  Goal: Discharge to home or other facility with appropriate resources  Description: INTERVENTIONS:  - Identify barriers to discharge w/patient and caregiver  - Arrange for needed discharge resources and transportation as appropriate  - Identify discharge learning needs (meds, wound care, etc.)  - Refer to Case Management Department for coordinating discharge planning if the patient needs post-hospital services based on physician/advanced practitioner order or complex needs related to functional status, cognitive  ability, or social support system  Outcome: Progressing     Problem: Knowledge Deficit  Goal: Patient/family/caregiver demonstrates understanding of disease process, treatment plan, medications, and discharge instructions  Description: Complete learning assessment and assess knowledge base.  Interventions:  - Provide teaching at level of understanding  - Provide teaching via preferred learning methods  Outcome: Progressing     Problem: NEUROSENSORY - ADULT  Goal: Achieves stable or improved neurological status  Description: INTERVENTIONS  - Monitor and report changes in neurological status  - Monitor vital signs such as temperature, blood pressure, glucose, and any other labs ordered   - Initiate measures to prevent increased intracranial pressure  - Monitor for seizure activity and implement precautions if appropriate      Outcome: Progressing  Goal: Achieves maximal functionality and self care  Description: INTERVENTIONS  - Monitor swallowing and airway patency with patient fatigue and changes in neurological status  - Encourage and assist patient to increase activity and self care.   - Encourage visually impaired, hearing impaired and aphasic patients to use assistive/communication devices  Outcome: Progressing     Problem: CARDIOVASCULAR - ADULT  Goal: Maintains optimal cardiac output and hemodynamic stability  Description: INTERVENTIONS:  - Monitor I/O, vital signs and rhythm  - Monitor for S/S and trends of decreased cardiac output  - Administer and titrate ordered vasoactive medications to optimize hemodynamic stability  - Assess quality of pulses, skin color and temperature  - Assess for signs of decreased coronary artery perfusion  - Instruct patient to report change in severity of symptoms  Outcome: Progressing  Goal: Absence of cardiac dysrhythmias or at baseline rhythm  Description: INTERVENTIONS:  - Continuous cardiac monitoring, vital signs, obtain 12 lead EKG if ordered  - Administer antiarrhythmic  and heart rate control medications as ordered  - Monitor electrolytes and administer replacement therapy as ordered  Outcome: Progressing     Problem: Prexisting or High Potential for Compromised Skin Integrity  Goal: Skin integrity is maintained or improved  Description: INTERVENTIONS:  - Identify patients at risk for skin breakdown  - Assess and monitor skin integrity  - Assess and monitor nutrition and hydration status  - Monitor labs   - Assess for incontinence   - Turn and reposition patient  - Assist with mobility/ambulation  - Relieve pressure over bony prominences  - Avoid friction and shearing  - Provide appropriate hygiene as needed including keeping skin clean and dry  - Evaluate need for skin moisturizer/barrier cream  - Collaborate with interdisciplinary team   - Patient/family teaching  - Consider wound care consult   Outcome: Progressing     Problem: Neurological Deficit  Goal: Neurological status is stable or improving  Description: Interventions:  - Monitor and assess patient's level of consciousness, motor function, sensory function, and level of assistance needed for ADLs.   - Monitor and report changes from baseline. Collaborate with interdisciplinary team to initiate plan and implement interventions as ordered.   - Provide and maintain a safe environment.  - Consider seizure precautions.  - Consider fall precautions.  - Consider aspiration precautions.  - Consider bleeding precautions.  Outcome: Progressing

## 2024-09-10 NOTE — PLAN OF CARE
Problem: OCCUPATIONAL THERAPY ADULT  Goal: Performs self-care activities at highest level of function for planned discharge setting.  See evaluation for individualized goals.  Description: Treatment Interventions: ADL retraining, Functional transfer training  Equipment Recommended:  (pending progressing/social support)       See flowsheet documentation for full assessment, interventions and recommendations.   Outcome: Progressing  Note: Limitation: Decreased ADL status, Decreased Safe judgement during ADL, Decreased cognition, Decreased endurance, Decreased self-care trans, Decreased high-level ADLs  Prognosis: Fair  Assessment: Pt seen for Occupational Therapy session with focus on activity tolerance, functional transfers/mob, AM ADLs/self-feeding and LB dressing, and informal cognition. Pt cleared by RN/ Emily for pt participated in OT session. Pt presented sitting out of bed to bedside chair and agreeable to participate in therapy following pt identifiers confirmed. Pt did not report a therapy goal this session however pt was pleasant and cooperative with all therapy requests. Pt required assist for LB dressing 2* pt need for cognitive support/sequencing of the tasks to doff shoes, don pants and again don shoes in sequence. Pt was noted for difficulty with focusing on his breakfast meal and to complete eating his meal all the way through. Pt required min encouragement to take bites of food 2* pt demonstrating a poor appetite  Pt recommend for II (Moderate Resources) and pt to benefit from OP OT for cognitive deficits. Pt set up to bedside chair post session, chair alarm activated and all needs within pt reach.     Rehab Resource Intensity Level, OT: II (Moderate Resource Intensity)

## 2024-09-10 NOTE — CASE MANAGEMENT
Case Management Discharge Planning Note    Patient name Edy Putnam  Location [facility] 705/[facility] 705-01 MRN [id number]  : 1945 Date 9/10/2024       Current Admission Date: 2024  Current Admission Diagnosis:Acute stroke due to ischemia (HCC)   Patient Active Problem List    Diagnosis Date Noted Date Diagnosed    B12 deficiency 2024     Pneumococcal pneumonia (HCC) 2024     S/P splenectomy 2024     Acute stroke due to ischemia (HCC) 2024     History of stroke 2024     Dementia ([facility]) 2024       LOS (days): 11  Geometric Mean LOS (GMLOS) (days):   Days to GMLOS:     OBJECTIVE:  Risk of Unplanned Readmission Score: 10.78         Current admission status: Inpatient   Preferred Pharmacy:   PATIENT/FAMILY REPORTS NO PREFERRED PHARMACY  No address on file      Primary Care Provider: No primary care provider on file.    Primary Insurance: [facility]  Secondary Insurance: MEDICARE    DISCHARGE DETAILS:          Other Referral/Resources/Interventions Provided:  Referral Comments: Received insurance authorization- [facility] messaged in [facility] to inquire when they can accept. TC to wife and made her aware.

## 2024-09-10 NOTE — OCCUPATIONAL THERAPY NOTE
"  Occupational Therapy Progress Note     Patient Name: Edy Putnam  Today's Date: 9/10/2024  Problem List  Principal Problem:    Acute stroke due to ischemia (HCC)  Active Problems:    History of stroke    Dementia (HCC)    Pneumococcal pneumonia (HCC)    S/P splenectomy    B12 deficiency         Occupational Therapy Treatment Note       09/10/24 0900   OT Last Visit   OT Visit Date 09/10/24   Note Type   Note Type Treatment   Pain Assessment   Pain Assessment Tool 0-10   Pain Rating: FLACC (Rest) - Face 0   Pain Rating: FLACC (Rest) - Legs 0   Pain Rating: FLACC (Rest) - Activity 0   Pain Rating: FLACC (Rest) - Cry 0   Pain Rating: FLACC (Rest) - Consolability 0   Score: FLACC (Rest) 0   Pain Rating: FLACC (Activity) - Face 0   Pain Rating: FLACC (Activity) - Legs 0   Pain Rating: FLACC (Activity) - Activity 0   Pain Rating: FLACC (Activity) - Cry 0   Pain Rating: FLACC (Activity) - Consolability 0   Score: FLACC (Activity) 0   Restrictions/Precautions   Weight Bearing Precautions Per Order No   Other Precautions Cognitive  (safety awareness)   Lifestyle   Autonomy I with ADL's/IADL's, no AD with functional mobiltiy, +drives   Reciprocal Relationships spouse   Service to Others retired   Intrinsic Gratification doing homemaking tasks   ADL   Where Assessed Chair   LB Dressing Assistance 5  Supervision/Setup   LB Dressing Deficit Thread RLE into pants;Thread LLE into pants  (cognitive support to initiate task/ pt required steps of task broke down to allow pt success)   Transfers   Sit to Stand 5  Supervision   Additional items Increased time required   Stand to Sit 5  Supervision   Additional items Increased time required   Functional Mobility   Functional Mobility 5  Supervision   Additional Comments without AD. pt with no LOB during walk in nursing hallway with OT   Subjective   Subjective pt stated \" I live in [place]\"   Cognition   Overall Cognitive Status Impaired   Arousal/Participation Cooperative "   Attention Attends with cues to redirect   Orientation Level Oriented to person;Oriented to place;Oriented to time;Disoriented to situation   Memory Decreased short term memory  (at baseline)   Following Commands Follows multistep commands with increased time or repetition   Comments pt engaged with therapist in memory questions to promote pt's cognitive processing skills.   Activity Tolerance   Activity Tolerance Patient tolerated treatment well   Assessment   Assessment Pt seen for Occupational Therapy session with focus on activity tolerance, functional transfers/mob, AM ADLs/self-feeding and LB dressing, and informal cognition. Pt cleared by RN/ Emily for pt participated in OT session. Pt presented sitting out of bed to bedside chair and agreeable to participate in therapy following pt identifiers confirmed. Pt did not report a therapy goal this session however pt was pleasant and cooperative with all therapy requests. Pt required assist for LB dressing 2* pt need for cognitive support/sequencing of the tasks to doff shoes, don pants and again don shoes in sequence. Pt was noted for difficulty with focusing on his breakfast meal and to complete eating his meal all the way through. Pt required min encouragement to take bites of food 2* pt demonstrating a poor appetite  Pt recommend for II (Moderate Resources) and pt to benefit from OP OT for cognitive deficits. Pt set up to bedside chair post session, chair alarm activated and all needs within pt reach.   Plan   Treatment Interventions ADL retraining;Functional transfer training   Goal Expiration Date 09/13/24   OT Treatment Day 2   OT Frequency 2-3x/wk   Discharge Recommendation   Rehab Resource Intensity Level, OT II (Moderate Resource Intensity)   AM-PAC Daily Activity Inpatient   Lower Body Dressing 2   Bathing 2   Toileting 2   Upper Body Dressing 3   Grooming 2   Eating 3   Daily Activity Raw Score 14   Daily Activity Standardized Score (Calc for Raw Score  >=11) 33.39   AM-PAC Applied Cognition Inpatient   Following a Speech/Presentation 2   Understanding Ordinary Conversation 4   Taking Medications 2   Remembering Where Things Are Placed or Put Away 2   Remembering List of 4-5 Errands 1   Taking Care of Complicated Tasks 1   Applied Cognition Raw Score 12   Applied Cognition Standardized Score 28.82   Barthel Index   Grooming Score 5   Dressing Score 5   Toilet Use Score 5   Transfers (Bed/Chair) Score 5   Mobility (Level Surface) Score 0     Mercedes BOWEN/MARITZA

## 2024-09-10 NOTE — CASE MANAGEMENT
Tami [phone]      [place] [employer] [place] has received APPROVED authorization.  Insurance:   [facility]  Called in by Rep:tami  P#  [phone] ex [phone]  Authorization received for: SNF  Facility:  [facility]  Authorization #: no authorization number   Start of Care:9/10  Next Review Date:30 days from today   Continued Stay Care Coordinator:  n/a  P#:   Submit next review to: [phone]      Care Manager notified: teresa roper     Please reach out to  for updates on any clinical information.      If transportation is needed tami said we can reach out to their transportation line and get a wheelchair van . The p: [phone] ex [phone] or ex [phone] if they have any issue since he is managed through [place] and tami is in [place] and approved him to give tami a call back

## 2024-09-10 NOTE — PLAN OF CARE
Problem: PAIN - ADULT  Goal: Verbalizes/displays adequate comfort level or baseline comfort level  Description: Interventions:  - Encourage patient to monitor pain and request assistance  - Assess pain using appropriate pain scale  - Administer analgesics based on type and severity of pain and evaluate response  - Implement non-pharmacological measures as appropriate and evaluate response  - Notify physician/advanced practitioner if interventions unsuccessful or patient reports new pain  Outcome: Progressing     Problem: SAFETY ADULT  Goal: Patient will remain free of falls  Description: INTERVENTIONS:  - Educate patient/family on patient safety including physical limitations  - Instruct patient to call for assistance with activity   - Consult OT/PT to assist with strengthening/mobility   - Keep Call bell within reach  - Keep bed low and locked with side rails adjusted as appropriate  - Keep care items and personal belongings within reach  - Initiate and maintain comfort rounds  - Make Fall Risk Sign visible to staff  - Offer Toileting every 2 Hours, in advance of need  - Initiate/Maintain bed/chair alarm  - Obtain necessary fall risk management equipment.  - Apply yellow socks and bracelet for high fall risk patients  - Consider moving patient to room near nurses station  Outcome: Progressing     Problem: DISCHARGE PLANNING  Goal: Discharge to home or other facility with appropriate resources  Description: INTERVENTIONS:  - Identify barriers to discharge w/patient and caregiver  - Arrange for needed discharge resources and transportation as appropriate  - Identify discharge learning needs (meds, wound care, etc.)  - Refer to Case Management Department for coordinating discharge planning if the patient needs post-hospital services based on physician/advanced practitioner order or complex needs related to functional status, cognitive ability, or social support system  Outcome: Progressing     Problem: Knowledge  Deficit  Goal: Patient/family/caregiver demonstrates understanding of disease process, treatment plan, medications, and discharge instructions  Description: Complete learning assessment and assess knowledge base.  Interventions:  - Provide teaching at level of understanding  - Provide teaching via preferred learning methods  Outcome: Progressing     Problem: NEUROSENSORY - ADULT  Goal: Achieves stable or improved neurological status  Description: INTERVENTIONS  - Monitor and report changes in neurological status  - Monitor vital signs such as temperature, blood pressure, glucose, and any other labs ordered   - Initiate measures to prevent increased intracranial pressure  - Monitor for seizure activity and implement precautions if appropriate      Outcome: Progressing     Problem: Prexisting or High Potential for Compromised Skin Integrity  Goal: Skin integrity is maintained or improved  Description: INTERVENTIONS:  - Identify patients at risk for skin breakdown  - Assess and monitor skin integrity  - Assess and monitor nutrition and hydration status  - Monitor labs   - Assess for incontinence   - Turn and reposition patient  - Assist with mobility/ambulation  - Relieve pressure over bony prominences  - Avoid friction and shearing  - Provide appropriate hygiene as needed including keeping skin clean and dry  - Evaluate need for skin moisturizer/barrier cream  - Collaborate with interdisciplinary team   - Patient/family teaching  - Consider wound care consult   Outcome: Progressing     Problem: Neurological Deficit  Goal: Neurological status is stable or improving  Description: Interventions:  - Monitor and assess patient's level of consciousness, motor function, sensory function, and level of assistance needed for ADLs.   - Monitor and report changes from baseline. Collaborate with interdisciplinary team to initiate plan and implement interventions as ordered.   - Provide and maintain a safe environment.  - Consider  seizure precautions.  - Consider fall precautions.  - Consider aspiration precautions.  - Consider bleeding precautions.  Outcome: Progressing

## 2024-09-11 VITALS
SYSTOLIC BLOOD PRESSURE: 113 MMHG | WEIGHT: 152 LBS | HEART RATE: 66 BPM | OXYGEN SATURATION: 94 % | DIASTOLIC BLOOD PRESSURE: 73 MMHG | RESPIRATION RATE: 17 BRPM | BODY MASS INDEX: 24.43 KG/M2 | TEMPERATURE: 97.5 F | HEIGHT: 66 IN

## 2024-09-11 PROBLEM — R73.03 PREDIABETES: Status: ACTIVE | Noted: 2024-09-11

## 2024-09-11 PROCEDURE — 99239 HOSP IP/OBS DSCHRG MGMT >30: CPT | Performed by: INTERNAL MEDICINE

## 2024-09-11 RX ORDER — ASPIRIN 81 MG/1
81 TABLET, CHEWABLE ORAL DAILY
Start: 2024-09-12

## 2024-09-11 RX ORDER — ATORVASTATIN CALCIUM 40 MG/1
40 TABLET, FILM COATED ORAL EVERY EVENING
Start: 2024-09-11

## 2024-09-11 RX ORDER — CYANOCOBALAMIN 1000 UG/ML
INJECTION, SOLUTION INTRAMUSCULAR; SUBCUTANEOUS
Start: 2024-09-11

## 2024-09-11 RX ADMIN — CYANOCOBALAMIN TAB 500 MCG 1000 MCG: 500 TAB at 08:27

## 2024-09-11 RX ADMIN — ENOXAPARIN SODIUM 40 MG: 40 INJECTION SUBCUTANEOUS at 08:27

## 2024-09-11 RX ADMIN — SENNOSIDES AND DOCUSATE SODIUM 1 TABLET: 50; 8.6 TABLET ORAL at 08:27

## 2024-09-11 RX ADMIN — ASPIRIN 81 MG CHEWABLE TABLET 81 MG: 81 TABLET CHEWABLE at 08:27

## 2024-09-11 NOTE — ASSESSMENT & PLAN NOTE
Pt presents from home for evaluation of AMS starting on  8/29.  No focal neurological deficits.   Baseline:patient able to do all chores around the house . Able to drive, cook, clean himself, pick out clothes  CTA head/ neck with no evidence of intercranial hemorrhage, Severe chronic microvascular ischemic change and superimposed chronic infarcts as detailed above. Age-indeterminate right temporal infarct, likely chronic.   EKG sinus without acute ischemic changes  CXR normal  MRI 8/31: Acute infarct involving the right temporal lobe with petechial hemorrhage.  -Small area of acute infarct within the left parietal periventricular white matter.  No clot on echo, tele was normal sinus no events  Placed on ASA and high intensity statin  Loop recorder/zio patch as op - referral to cardiology placed  Continue PT OT  Medically clear for discharge to [facility] today

## 2024-09-11 NOTE — CASE MANAGEMENT
Case Management Discharge Planning Note    Patient name Edy Putnam  Location [facility] 705/[facility] 705-01 MRN [id number]  : 1945 Date 2024       Current Admission Date: 2024  Current Admission Diagnosis:Acute stroke due to ischemia (HCC)   Patient Active Problem List    Diagnosis Date Noted Date Diagnosed    Prediabetes 2024     B12 deficiency 2024     Pneumococcal pneumonia (HCC) 2024     S/P splenectomy 2024     Acute stroke due to ischemia (HCC) 2024     History of stroke 2024     Dementia (HCC) 2024       LOS (days): 12  Geometric Mean LOS (GMLOS) (days):   Days to GMLOS:     OBJECTIVE:  Risk of Unplanned Readmission Score: 10.97         Current admission status: Inpatient   Preferred Pharmacy:   PATIENT/FAMILY REPORTS NO PREFERRED PHARMACY  No address on file      Primary Care Provider: No primary care provider on file.    Primary Insurance: [facility]  Secondary Insurance: MEDICARE    DISCHARGE DETAILS:    Discharge planning discussed with:: Tylor           Other Referral/Resources/Interventions Provided:  Referral Comments: Patient for discharge to [facility] today- wife, provider, and nurse made aware- Patient will be [facility] van at 2:00.         Treatment Team Recommendation: Short Term Rehab, SNF  Discharge Destination Plan:: SNF, Short Term Rehab  Transport at Discharge : Wheelchair van  Dispatcher Contacted: Yes     Transported by (Company and Unit #): [facility]  ETA of Transport (Date): 24  ETA of Transport (Time): 1400          [facility]

## 2024-09-11 NOTE — DISCHARGE SUMMARY
"Discharge Summary - Hospitalist   Name: Edy Putnam 79 y.o. male I MRN: [id number]  Unit/Bed#: [facility] 705-01 I Date of Admission: 8/29/2024   Date of Service: 9/11/2024 I Hospital Day: 12     Assessment & Plan  Acute stroke due to ischemia (HCC)  Pt presents from home for evaluation of AMS starting on  8/29.  No focal neurological deficits.   Baseline:patient able to do all chores around the house . Able to drive, cook, clean himself, pick out clothes  CTA head/ neck with no evidence of intercranial hemorrhage, Severe chronic microvascular ischemic change and superimposed chronic infarcts as detailed above. Age-indeterminate right temporal infarct, likely chronic.   EKG sinus without acute ischemic changes  CXR normal  MRI 8/31: Acute infarct involving the right temporal lobe with petechial hemorrhage.  -Small area of acute infarct within the left parietal periventricular white matter.  No clot on echo, tele was normal sinus no events  Placed on ASA and high intensity statin  Loop recorder/zio patch as op - referral to cardiology placed  Continue PT OT  Medically clear for discharge to [facility] today  History of stroke  Wife reports a history of a \"[place]\" CVA in 2015 and patient has had short term memory deficits since then   Patient is reportedly on aspirin as well as \"simvastatin\" but wife unsure of medication doses  Aspirin, High Intensity statin   Dementia (HCC)  Follows with [facility] and Neurologist Dr. Peters  Step down progression with most recent stroke. 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  Admission Date:   Admission Orders (From admission, onward)       Ordered        08/30/24 1606  INPATIENT ADMISSION  Once            08/29/24 2309  Place in Observation  Once                          Discharge Date: 09/11/24    Disposition:    Other Skilled Nursing Facility at [facility]    Reason for Admission: stroke    Discharge Diagnoses:   Please see assessment and plan section above for further details regarding discharge diagnoses.     Consultations During Hospital Stay:  Neurology    Procedures Performed:   None     Significant Findings / Test Results:   CTA head and neck: CT Brain:  No acute intracranial no hemorrhage or mass effect. Severe chronic microvascular ischemic change and superimposed chronic infarcts as detailed above. Age-indeterminate right temporal infarct, likely chronic. MRI brain should be considered for further   evaluation of this area and other potential small areas of infarction unseen on CT.     CT Angiography:  No evidence of large aneurysm, high-grade stenosis, or large vessel occlusion.    MRI brain: -Acute infarct involving the right temporal lobe with petechial hemorrhage.  -Small area of acute infarct within the left parietal periventricular white matter.  -Remote supra and infratentorial infarcts and chronic microangiopathic changes as above.    Echocardiogram: Left Ventricle: Left ventricular cavity size is normal. Wall thickness is normal. The left ventricular ejection fraction is 60%. Systolic function is normal. Wall motion is normal. Diastolic function is normal.    Right Ventricle: Right ventricular cavity size is mildly dilated. Systolic function is normal.    Left Atrium: The atrium is mildly dilated.    Mitral Valve: There is mild regurgitation.     "Tricuspid Valve: There is mild regurgitation. The estimated right ventricular systolic pressure is 34.00 mmHg.    Vitamin B12: 241    Incidental Findings:   None       Test Results Pending at Discharge (will require follow up):   None     Outpatient Tests Requested:  Zio patch    Complications:  None    Hospital Course:    Edy Putnam is a 79 y.o. male patient who originally presented to the hospital on 8/29/2024 due to change in mental status. He was admitted and found to have CVA. He was seen by neurology and had recommendations made for further prevention. Echocardiogram and CTA were as mentioned above. He will need an outpatient zio patch to monitor for cryptogenic AF. He stabilized and was discharged to  rehabilitation.    Condition at Discharge: stable    Discharge Day Visit / Exam:   Subjective:  denies any complaints.  Vitals: Blood Pressure: 113/73 (09/11/24 0735)  Pulse: 66 (09/11/24 0735)  Temperature: 97.5 °F (36.4 °C) (09/11/24 0735)  Temp Source: Oral (09/08/24 1529)  Respirations: 17 (09/09/24 2131)  Height: 5' 6\" (167.6 cm) (08/30/24 0800)  Weight - Scale: 68.9 kg (152 lb) (08/30/24 0800)  SpO2: 94 % (09/11/24 0735)  Exam:   Physical Exam  Constitutional:       Appearance: Normal appearance.   HENT:      Head: Normocephalic and atraumatic.      Nose: Nose normal.   Eyes:      Extraocular Movements: Extraocular movements intact.   Cardiovascular:      Rate and Rhythm: Normal rate and regular rhythm.   Pulmonary:      Effort: Pulmonary effort is normal.      Breath sounds: No wheezing or rales.   Skin:     General: Skin is warm and dry.   Neurological:      Mental Status: He is alert. Mental status is at baseline. He is disoriented.      Cranial Nerves: No cranial nerve deficit.      Motor: No weakness.       Medication Adjustments and Discharge Medications:  Discharge Medication List: See after visit summary for reconciled discharge medications.   Medication Dosing Tapers - Please refer to " Discharge Medication List for details on any medication dosing tapers (if applicable to patient).   Summary of Medication Adjustments made as a result of this hospitalization: see list  Medications being temporarily held (include recommended restart time): None    Wound Care Recommendations:  When applicable, please see wound care section of After Visit Summary.    Instructions for any Catheters / Lines Present at Discharge (including removal date, if applicable): None    Diet Recommendations at Discharge:  Diet -        Diet Orders   (From admission, onward)                 Start     Ordered    08/29/24 2345  Diet Cardiovascular; Sodium 2 GM  Diet effective now        References:    Adult Nutrition Support Algorithm    RD Therapeutic Diet Order Protocol   Question Answer Comment   Diet Type Cardiovascular    Cardiac Sodium 2 GM    RD to adjust diet per protocol? Yes        08/29/24 2060                    Mobility at time of Discharge:   Basic Mobility Inpatient Raw Score: 19  JH-HLM Goal: 6: Walk 10 steps or more  JH-HLM Achieved: 6: Walk 10 steps or more  HLM Goal achieved. Continue to encourage appropriate mobility.    Goals of Care Discussions:  Code Status at Discharge: Level 3 - DNAR and DNI  Goals of care were not discussed during this admission.    Discharge instructions/Information to patient and family:   See After Visit Summary (AVS) titled Discharge Instructions for information provided to patient and family.      Planned Readmission: No      Administrative Statements   Discharge Statement:  I have spent a total time of 45 minutes in caring for this patient on the day of the visit/encounter. >30 minutes of time was spent on: Counseling / Coordination of care, Documenting in the medical record, Reviewing / ordering tests, medicine, procedures  , and Communicating with other healthcare professionals .    **Please Note: This note may have been constructed using a voice recognition system.**

## 2024-09-11 NOTE — PLAN OF CARE
Problem: PAIN - ADULT  Goal: Verbalizes/displays adequate comfort level or baseline comfort level  Description: Interventions:  - Encourage patient to monitor pain and request assistance  - Assess pain using appropriate pain scale  - Administer analgesics based on type and severity of pain and evaluate response  - Implement non-pharmacological measures as appropriate and evaluate response  - Notify physician/advanced practitioner if interventions unsuccessful or patient reports new pain  Outcome: Progressing     Problem: INFECTION - ADULT  Goal: Absence or prevention of progression during hospitalization  Description: INTERVENTIONS:  - Assess and monitor for signs and symptoms of infection  - Monitor lab/diagnostic results  - Monitor all insertion sites, i.e. indwelling lines, tubes, and drains  - [place] appropriate cooling/warming therapies per order  - Administer medications as ordered  - Instruct and encourage patient and family to use good hand hygiene technique  - Identify and instruct in appropriate isolation precautions for identified infection/condition  Outcome: Progressing     Problem: SAFETY ADULT  Goal: Patient will remain free of falls  Description: INTERVENTIONS:  - Educate patient/family on patient safety including physical limitations  - Instruct patient to call for assistance with activity   - Consult OT/PT to assist with strengthening/mobility   - Keep Call bell within reach  - Keep bed low and locked with side rails adjusted as appropriate  - Keep care items and personal belongings within reach  - Initiate and maintain comfort rounds  - Make Fall Risk Sign visible to staff  - Offer Toileting every 2 Hours, in advance of need  - Initiate/Maintain bed/chair alarm  - Obtain necessary fall risk management equipment.  - Apply yellow socks and bracelet for high fall risk patients  - Consider moving patient to room near nurses station  Outcome: Progressing  Goal: Maintain or return to baseline ADL  function  Description: INTERVENTIONS:  -  Assess patient's ability to carry out ADLs; assess patient's baseline for ADL function and identify physical deficits which impact ability to perform ADLs (bathing, care of mouth/teeth, toileting, grooming, dressing, etc.)  - Assess/evaluate cause of self-care deficits   - Assess range of motion  - Assess patient's mobility; develop plan if impaired  - Assess patient's need for assistive devices and provide as appropriate  - Encourage maximum independence but intervene and supervise when necessary  - Involve family in performance of ADLs  - Assess for home care needs following discharge   - Consider OT consult to assist with ADL evaluation and planning for discharge  - Provide patient education as appropriate  Outcome: Progressing  Goal: Maintains/Returns to pre admission functional level  Description: INTERVENTIONS:  - Perform AM-PAC 6 Click Basic Mobility/ Daily Activity assessment daily.  - Set and communicate daily mobility goal to care team and patient/family/caregiver.   - Collaborate with rehabilitation services on mobility goals if consulted  - Dangle patient 3 times a day  - Stand patient 3 times a day  - Ambulate patient 3 times a day  - Out of bed to chair 3 times a day   - Out of bed for meals 3 times a day  - Out of bed for toileting  - Record patient progress and toleration of activity level   Outcome: Progressing     Problem: DISCHARGE PLANNING  Goal: Discharge to home or other facility with appropriate resources  Description: INTERVENTIONS:  - Identify barriers to discharge w/patient and caregiver  - Arrange for needed discharge resources and transportation as appropriate  - Identify discharge learning needs (meds, wound care, etc.)  - Refer to Case Management Department for coordinating discharge planning if the patient needs post-hospital services based on physician/advanced practitioner order or complex needs related to functional status, cognitive  ability, or social support system  Outcome: Progressing     Problem: Knowledge Deficit  Goal: Patient/family/caregiver demonstrates understanding of disease process, treatment plan, medications, and discharge instructions  Description: Complete learning assessment and assess knowledge base.  Interventions:  - Provide teaching at level of understanding  - Provide teaching via preferred learning methods  Outcome: Progressing     Problem: NEUROSENSORY - ADULT  Goal: Achieves stable or improved neurological status  Description: INTERVENTIONS  - Monitor and report changes in neurological status  - Monitor vital signs such as temperature, blood pressure, glucose, and any other labs ordered   - Initiate measures to prevent increased intracranial pressure  - Monitor for seizure activity and implement precautions if appropriate      Outcome: Progressing  Goal: Achieves maximal functionality and self care  Description: INTERVENTIONS  - Monitor swallowing and airway patency with patient fatigue and changes in neurological status  - Encourage and assist patient to increase activity and self care.   - Encourage visually impaired, hearing impaired and aphasic patients to use assistive/communication devices  Outcome: Progressing     Problem: CARDIOVASCULAR - ADULT  Goal: Maintains optimal cardiac output and hemodynamic stability  Description: INTERVENTIONS:  - Monitor I/O, vital signs and rhythm  - Monitor for S/S and trends of decreased cardiac output  - Administer and titrate ordered vasoactive medications to optimize hemodynamic stability  - Assess quality of pulses, skin color and temperature  - Assess for signs of decreased coronary artery perfusion  - Instruct patient to report change in severity of symptoms  Outcome: Progressing  Goal: Absence of cardiac dysrhythmias or at baseline rhythm  Description: INTERVENTIONS:  - Continuous cardiac monitoring, vital signs, obtain 12 lead EKG if ordered  - Administer antiarrhythmic  and heart rate control medications as ordered  - Monitor electrolytes and administer replacement therapy as ordered  Outcome: Progressing     Problem: Prexisting or High Potential for Compromised Skin Integrity  Goal: Skin integrity is maintained or improved  Description: INTERVENTIONS:  - Identify patients at risk for skin breakdown  - Assess and monitor skin integrity  - Assess and monitor nutrition and hydration status  - Monitor labs   - Assess for incontinence   - Turn and reposition patient  - Assist with mobility/ambulation  - Relieve pressure over bony prominences  - Avoid friction and shearing  - Provide appropriate hygiene as needed including keeping skin clean and dry  - Evaluate need for skin moisturizer/barrier cream  - Collaborate with interdisciplinary team   - Patient/family teaching  - Consider wound care consult   Outcome: Progressing     Problem: Neurological Deficit  Goal: Neurological status is stable or improving  Description: Interventions:  - Monitor and assess patient's level of consciousness, motor function, sensory function, and level of assistance needed for ADLs.   - Monitor and report changes from baseline. Collaborate with interdisciplinary team to initiate plan and implement interventions as ordered.   - Provide and maintain a safe environment.  - Consider seizure precautions.  - Consider fall precautions.  - Consider aspiration precautions.  - Consider bleeding precautions.  Outcome: Progressing

## 2024-09-11 NOTE — RESTORATIVE TECHNICIAN NOTE
Restorative Technician Note      Patient Name: Edy Putnam     Note Type: Mobility  Patient Position Upon Consult: Seated edge of bed  Activity Performed: Ambulated; Dangled; Stood  Assistive Device: Other (Comment) (none)  Education Provided: Yes  Patient Position at End of Consult: Supine; All needs within reach; Bed/Chair alarm activated    Amandeep MILLER, Restorative Technician,

## 2024-09-11 NOTE — PLAN OF CARE
Problem: PAIN - ADULT  Goal: Verbalizes/displays adequate comfort level or baseline comfort level  Description: Interventions:  - Encourage patient to monitor pain and request assistance  - Assess pain using appropriate pain scale  - Administer analgesics based on type and severity of pain and evaluate response  - Implement non-pharmacological measures as appropriate and evaluate response  - Notify physician/advanced practitioner if interventions unsuccessful or patient reports new pain  9/11/2024 0742 by Jean Paul Dominguez RN  Outcome: Progressing  9/11/2024 0258 by Jean Paul Dominguez RN  Outcome: Progressing     Problem: INFECTION - ADULT  Goal: Absence or prevention of progression during hospitalization  Description: INTERVENTIONS:  - Assess and monitor for signs and symptoms of infection  - Monitor lab/diagnostic results  - Monitor all insertion sites, i.e. indwelling lines, tubes, and drains  - Almond appropriate cooling/warming therapies per order  - Administer medications as ordered  - Instruct and encourage patient and family to use good hand hygiene technique  - Identify and instruct in appropriate isolation precautions for identified infection/condition  9/11/2024 0742 by Jean Paul Dominguez RN  Outcome: Progressing  9/11/2024 0258 by Jean Paul Dominguez RN  Outcome: Progressing     Problem: SAFETY ADULT  Goal: Patient will remain free of falls  Description: INTERVENTIONS:  - Educate patient/family on patient safety including physical limitations  - Instruct patient to call for assistance with activity   - Consult OT/PT to assist with strengthening/mobility   - Keep Call bell within reach  - Keep bed low and locked with side rails adjusted as appropriate  - Keep care items and personal belongings within reach  - Initiate and maintain comfort rounds  - Make Fall Risk Sign visible to staff  - Offer Toileting every 2 Hours, in advance of need  - Initiate/Maintain bed/chair alarm  - Obtain necessary fall  risk management equipment.  - Apply yellow socks and bracelet for high fall risk patients  - Consider moving patient to room near nurses station  9/11/2024 0742 by Jean Paul Dominguez RN  Outcome: Progressing  9/11/2024 0258 by Jean Paul Dominguez RN  Outcome: Progressing  Goal: Maintain or return to baseline ADL function  Description: INTERVENTIONS:  -  Assess patient's ability to carry out ADLs; assess patient's baseline for ADL function and identify physical deficits which impact ability to perform ADLs (bathing, care of mouth/teeth, toileting, grooming, dressing, etc.)  - Assess/evaluate cause of self-care deficits   - Assess range of motion  - Assess patient's mobility; develop plan if impaired  - Assess patient's need for assistive devices and provide as appropriate  - Encourage maximum independence but intervene and supervise when necessary  - Involve family in performance of ADLs  - Assess for home care needs following discharge   - Consider OT consult to assist with ADL evaluation and planning for discharge  - Provide patient education as appropriate  9/11/2024 0742 by Jean Paul Dominguez RN  Outcome: Progressing  9/11/2024 0258 by Jean Paul Dominguez RN  Outcome: Progressing  Goal: Maintains/Returns to pre admission functional level  Description: INTERVENTIONS:  - Perform AM-PAC 6 Click Basic Mobility/ Daily Activity assessment daily.  - Set and communicate daily mobility goal to care team and patient/family/caregiver.   - Collaborate with rehabilitation services on mobility goals if consulted  - Dangle patient 3 times a day  - Stand patient 3 times a day  - Ambulate patient 3 times a day  - Out of bed to chair 3 times a day   - Out of bed for meals 3 times a day  - Out of bed for toileting  - Record patient progress and toleration of activity level   9/11/2024 0742 by Jean Paul Dominguez RN  Outcome: Progressing  9/11/2024 0258 by Jean Paul Dominguez RN  Outcome: Progressing     Problem: DISCHARGE  PLANNING  Goal: Discharge to home or other facility with appropriate resources  Description: INTERVENTIONS:  - Identify barriers to discharge w/patient and caregiver  - Arrange for needed discharge resources and transportation as appropriate  - Identify discharge learning needs (meds, wound care, etc.)  - Refer to Case Management Department for coordinating discharge planning if the patient needs post-hospital services based on physician/advanced practitioner order or complex needs related to functional status, cognitive ability, or social support system  9/11/2024 0742 by Jean Paul Dominguez RN  Outcome: Progressing  9/11/2024 0258 by Jean Paul Dominguez RN  Outcome: Progressing     Problem: Knowledge Deficit  Goal: Patient/family/caregiver demonstrates understanding of disease process, treatment plan, medications, and discharge instructions  Description: Complete learning assessment and assess knowledge base.  Interventions:  - Provide teaching at level of understanding  - Provide teaching via preferred learning methods  9/11/2024 0742 by Jean Paul Dominguez RN  Outcome: Progressing  9/11/2024 0258 by Jean Paul Dominguez RN  Outcome: Progressing     Problem: NEUROSENSORY - ADULT  Goal: Achieves stable or improved neurological status  Description: INTERVENTIONS  - Monitor and report changes in neurological status  - Monitor vital signs such as temperature, blood pressure, glucose, and any other labs ordered   - Initiate measures to prevent increased intracranial pressure  - Monitor for seizure activity and implement precautions if appropriate      9/11/2024 0742 by Jean Paul Dominguez RN  Outcome: Progressing  9/11/2024 0258 by Jean Paul Dominguez RN  Outcome: Progressing  Goal: Achieves maximal functionality and self care  Description: INTERVENTIONS  - Monitor swallowing and airway patency with patient fatigue and changes in neurological status  - Encourage and assist patient to increase activity and self care.   -  Encourage visually impaired, hearing impaired and aphasic patients to use assistive/communication devices  9/11/2024 0742 by Jean Paul Dominguez RN  Outcome: Progressing  9/11/2024 0258 by Jean Paul Dominguez RN  Outcome: Progressing     Problem: CARDIOVASCULAR - ADULT  Goal: Maintains optimal cardiac output and hemodynamic stability  Description: INTERVENTIONS:  - Monitor I/O, vital signs and rhythm  - Monitor for S/S and trends of decreased cardiac output  - Administer and titrate ordered vasoactive medications to optimize hemodynamic stability  - Assess quality of pulses, skin color and temperature  - Assess for signs of decreased coronary artery perfusion  - Instruct patient to report change in severity of symptoms  9/11/2024 0742 by Jean Paul Dominguez RN  Outcome: Progressing  9/11/2024 0258 by Jean Paul Dominguez RN  Outcome: Progressing  Goal: Absence of cardiac dysrhythmias or at baseline rhythm  Description: INTERVENTIONS:  - Continuous cardiac monitoring, vital signs, obtain 12 lead EKG if ordered  - Administer antiarrhythmic and heart rate control medications as ordered  - Monitor electrolytes and administer replacement therapy as ordered  9/11/2024 0742 by Jean Paul Dominguez RN  Outcome: Progressing  9/11/2024 0258 by Jean Paul oDminguez RN  Outcome: Progressing     Problem: Prexisting or High Potential for Compromised Skin Integrity  Goal: Skin integrity is maintained or improved  Description: INTERVENTIONS:  - Identify patients at risk for skin breakdown  - Assess and monitor skin integrity  - Assess and monitor nutrition and hydration status  - Monitor labs   - Assess for incontinence   - Turn and reposition patient  - Assist with mobility/ambulation  - Relieve pressure over bony prominences  - Avoid friction and shearing  - Provide appropriate hygiene as needed including keeping skin clean and dry  - Evaluate need for skin moisturizer/barrier cream  - Collaborate with interdisciplinary team   -  Patient/family teaching  - Consider wound care consult   9/11/2024 0742 by Jean Paul Dominguez RN  Outcome: Progressing  9/11/2024 0258 by Jean Paul Dominguez RN  Outcome: Progressing     Problem: Neurological Deficit  Goal: Neurological status is stable or improving  Description: Interventions:  - Monitor and assess patient's level of consciousness, motor function, sensory function, and level of assistance needed for ADLs.   - Monitor and report changes from baseline. Collaborate with interdisciplinary team to initiate plan and implement interventions as ordered.   - Provide and maintain a safe environment.  - Consider seizure precautions.  - Consider fall precautions.  - Consider aspiration precautions.  - Consider bleeding precautions.  9/11/2024 0742 by Jean Paul Dominguez RN  Outcome: Progressing  9/11/2024 0258 by Jean Paul Dominguez RN  Outcome: Progressing

## 2024-09-11 NOTE — ASSESSMENT & PLAN NOTE
Follows with [facility] and Neurologist Dr. Peters  Step down progression with most recent stroke. Worse short term memory per family  Spouse reports that over the past several months patient has been more forgetful with regards to medications and not able   Likely vascular dementia

## 2024-09-12 ENCOUNTER — TELEPHONE (OUTPATIENT)
Dept: CARDIOLOGY CLINIC | Facility: CLINIC | Age: 79
End: 2024-09-12

## 2024-09-12 NOTE — TELEPHONE ENCOUNTER
Left message for patient to call back and schedule a New Patient hospital follow up per discharge paperwork from recent hospital stay. Referral in system

## 2024-09-23 ENCOUNTER — TELEPHONE (OUTPATIENT)
Dept: NEUROLOGY | Facility: CLINIC | Age: 79
End: 2024-09-23

## 2024-09-23 NOTE — TELEPHONE ENCOUNTER
Post CVA Discharge Follow Up  Hospitalization: 8/29/24-9/11/24    Called patient to obtain an update. There was no answer. Left a voice message requesting for a return call. Provided the office's phone number.

## 2024-09-29 PROBLEM — J13 PNEUMOCOCCAL PNEUMONIA (HCC): Status: RESOLVED | Noted: 2024-08-30 | Resolved: 2024-09-29

## 2024-10-04 NOTE — TELEPHONE ENCOUNTER
Post CVA Discharge Follow Up  Hospitalization: 8/29/24-9/11/24    Per note from [facility], neurology , on 9/20/24, patient declined follow up visit with [facility] since he is back in [place] and [facility].     Will close encounter.